# Patient Record
Sex: FEMALE | Race: WHITE | NOT HISPANIC OR LATINO | Employment: UNEMPLOYED
[De-identification: names, ages, dates, MRNs, and addresses within clinical notes are randomized per-mention and may not be internally consistent; named-entity substitution may affect disease eponyms.]

---

## 2017-01-13 ENCOUNTER — CHARTING TRANS (OUTPATIENT)
Dept: OTHER | Age: 47
End: 2017-01-13

## 2017-01-13 ASSESSMENT — PAIN SCALES - GENERAL: PAINLEVEL_OUTOF10: 0

## 2017-02-15 ENCOUNTER — IMAGING SERVICES (OUTPATIENT)
Dept: OTHER | Age: 47
End: 2017-02-15

## 2017-02-15 ENCOUNTER — HOSPITAL (OUTPATIENT)
Dept: OTHER | Age: 47
End: 2017-02-15
Attending: RADIOLOGY

## 2017-03-16 ENCOUNTER — OFFICE VISIT (OUTPATIENT)
Dept: OBGYN CLINIC | Facility: CLINIC | Age: 47
End: 2017-03-16

## 2017-03-16 VITALS
HEIGHT: 63 IN | BODY MASS INDEX: 31.71 KG/M2 | DIASTOLIC BLOOD PRESSURE: 60 MMHG | SYSTOLIC BLOOD PRESSURE: 126 MMHG | WEIGHT: 179 LBS | HEART RATE: 88 BPM

## 2017-03-16 DIAGNOSIS — N76.0 VAGINITIS AND VULVOVAGINITIS: Primary | ICD-10-CM

## 2017-03-16 DIAGNOSIS — Z01.419 WELL WOMAN EXAM WITH ROUTINE GYNECOLOGICAL EXAM: ICD-10-CM

## 2017-03-16 PROCEDURE — 99213 OFFICE O/P EST LOW 20 MIN: CPT | Performed by: OBSTETRICS & GYNECOLOGY

## 2017-03-16 PROCEDURE — 87480 CANDIDA DNA DIR PROBE: CPT | Performed by: OBSTETRICS & GYNECOLOGY

## 2017-03-16 PROCEDURE — 87660 TRICHOMONAS VAGIN DIR PROBE: CPT | Performed by: OBSTETRICS & GYNECOLOGY

## 2017-03-16 PROCEDURE — 87510 GARDNER VAG DNA DIR PROBE: CPT | Performed by: OBSTETRICS & GYNECOLOGY

## 2017-03-16 RX ORDER — VALACYCLOVIR HYDROCHLORIDE 1 G/1
TABLET, FILM COATED ORAL
Refills: 1 | COMMUNITY
Start: 2017-03-03 | End: 2019-06-07

## 2017-03-16 RX ORDER — HYDROXYZINE HYDROCHLORIDE 10 MG/1
10 TABLET, FILM COATED ORAL 3 TIMES DAILY PRN
Refills: 0 | Status: CANCELLED | OUTPATIENT
Start: 2017-03-16

## 2017-03-16 NOTE — PROCEDURES
Dima Kearney is a 48year old female. 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 3 weeks with itching and fissures ext vulva.   No dysuria or fever

## 2017-06-14 ENCOUNTER — HOSPITAL (OUTPATIENT)
Dept: OTHER | Age: 47
End: 2017-06-14
Attending: INTERNAL MEDICINE

## 2017-11-04 ENCOUNTER — HOSPITAL ENCOUNTER (EMERGENCY)
Facility: HOSPITAL | Age: 47
Discharge: HOME | End: 2017-11-05
Attending: EMERGENCY MEDICINE
Payer: COMMERCIAL

## 2017-11-04 ENCOUNTER — APPOINTMENT (OUTPATIENT)
Dept: GENERAL RADIOLOGY | Facility: HOSPITAL | Age: 47
End: 2017-11-04
Attending: EMERGENCY MEDICINE
Payer: COMMERCIAL

## 2017-11-04 VITALS
DIASTOLIC BLOOD PRESSURE: 95 MMHG | TEMPERATURE: 99 F | OXYGEN SATURATION: 98 % | SYSTOLIC BLOOD PRESSURE: 135 MMHG | WEIGHT: 170 LBS | BODY MASS INDEX: 30.12 KG/M2 | RESPIRATION RATE: 20 BRPM | HEART RATE: 100 BPM | HEIGHT: 63 IN

## 2017-11-04 DIAGNOSIS — S46.911A STRAIN OF RIGHT SHOULDER, INITIAL ENCOUNTER: Primary | ICD-10-CM

## 2017-11-04 PROCEDURE — 99283 EMERGENCY DEPT VISIT LOW MDM: CPT

## 2017-11-04 PROCEDURE — 73030 X-RAY EXAM OF SHOULDER: CPT | Performed by: EMERGENCY MEDICINE

## 2017-11-04 RX ORDER — IBUPROFEN 600 MG/1
600 TABLET ORAL ONCE
Status: COMPLETED | OUTPATIENT
Start: 2017-11-04 | End: 2017-11-04

## 2017-11-05 NOTE — ED PROVIDER NOTES
Patient Seen in: [facility] Emergency Department    History   Patient presents with:  Upper Extremity Injury (musculoskeletal)    Stated Complaint: rt arm pain hx of fall    HPI    Sharp constant right shoulder pain after a fall at [employer].   Patient and atraumatic. Eyes: Conjunctivae are normal. Pupils are equal, round, and reactive to light. No scleral icterus. Neck: Normal range of motion. Neck supple. No C-spine tenderness   Cardiovascular: Normal rate and intact distal pulses.     Pulmonary/C ibuprofen. She will be discharged home with PCP follow-up.   Patient is well-appearing nontoxic stable for discharge      Disposition and Plan     Clinical Impression:  Strain of right shoulder, initial encounter  (primary encounter diagnosis)    Dispositi

## 2017-11-05 NOTE — ED INITIAL ASSESSMENT (HPI)
At approx 2100 patient was at a fast food restaurant and slipped on a wet floor and fell and injured her lip, right shoulder, and left knee.  She has limited ROM and radiating pain to the thumb on the right arm

## 2017-11-13 PROBLEM — S40.021A CONTUSION OF MULTIPLE SITES OF RIGHT SHOULDER AND UPPER ARM, INITIAL ENCOUNTER: Status: ACTIVE | Noted: 2017-11-13

## 2017-11-13 PROBLEM — S40.011A CONTUSION OF MULTIPLE SITES OF RIGHT SHOULDER AND UPPER ARM, INITIAL ENCOUNTER: Status: ACTIVE | Noted: 2017-11-13

## 2017-11-19 PROBLEM — M25.519 SHOULDER PAIN, ACUTE: Status: ACTIVE | Noted: 2017-11-19

## 2017-11-19 PROBLEM — R29.898 WEAKNESS OF SHOULDER: Status: ACTIVE | Noted: 2017-11-19

## 2017-11-21 ENCOUNTER — CHARTING TRANS (OUTPATIENT)
Dept: OTHER | Age: 47
End: 2017-11-21

## 2017-11-21 ASSESSMENT — PAIN SCALES - GENERAL: PAINLEVEL_OUTOF10: 0

## 2017-12-21 PROBLEM — N76.0 VAGINITIS AND VULVOVAGINITIS: Status: RESOLVED | Noted: 2017-03-16 | Resolved: 2017-12-21

## 2018-04-30 ENCOUNTER — APPOINTMENT (OUTPATIENT)
Dept: CT IMAGING | Facility: HOSPITAL | Age: 48
End: 2018-04-30
Payer: COMMERCIAL

## 2018-04-30 ENCOUNTER — HOSPITAL ENCOUNTER (EMERGENCY)
Facility: HOSPITAL | Age: 48
Discharge: HOME | End: 2018-05-01
Payer: COMMERCIAL

## 2018-04-30 DIAGNOSIS — J01.91 ACUTE RECURRENT SINUSITIS, UNSPECIFIED LOCATION: Primary | ICD-10-CM

## 2018-04-30 PROCEDURE — 70450 CT HEAD/BRAIN W/O DYE: CPT

## 2018-04-30 PROCEDURE — 99284 EMERGENCY DEPT VISIT MOD MDM: CPT

## 2018-04-30 PROCEDURE — 96361 HYDRATE IV INFUSION ADD-ON: CPT

## 2018-04-30 PROCEDURE — 80053 COMPREHEN METABOLIC PANEL: CPT

## 2018-04-30 PROCEDURE — 85025 COMPLETE CBC W/AUTO DIFF WBC: CPT

## 2018-04-30 PROCEDURE — 96374 THER/PROPH/DIAG INJ IV PUSH: CPT

## 2018-04-30 PROCEDURE — 96375 TX/PRO/DX INJ NEW DRUG ADDON: CPT

## 2018-04-30 RX ORDER — KETOROLAC TROMETHAMINE 30 MG/ML
10 INJECTION, SOLUTION INTRAMUSCULAR; INTRAVENOUS ONCE
Status: COMPLETED | OUTPATIENT
Start: 2018-04-30 | End: 2018-04-30

## 2018-04-30 RX ORDER — SODIUM CHLORIDE 9 MG/ML
1000 INJECTION, SOLUTION INTRAVENOUS ONCE
Status: COMPLETED | OUTPATIENT
Start: 2018-04-30 | End: 2018-05-01

## 2018-04-30 RX ORDER — DIPHENHYDRAMINE HYDROCHLORIDE 50 MG/ML
25 INJECTION INTRAMUSCULAR; INTRAVENOUS ONCE
Status: COMPLETED | OUTPATIENT
Start: 2018-04-30 | End: 2018-04-30

## 2018-04-30 RX ORDER — METOCLOPRAMIDE HYDROCHLORIDE 5 MG/ML
5 INJECTION INTRAMUSCULAR; INTRAVENOUS ONCE
Status: COMPLETED | OUTPATIENT
Start: 2018-04-30 | End: 2018-04-30

## 2018-05-01 VITALS
TEMPERATURE: 99 F | BODY MASS INDEX: 31.01 KG/M2 | DIASTOLIC BLOOD PRESSURE: 76 MMHG | SYSTOLIC BLOOD PRESSURE: 121 MMHG | HEART RATE: 86 BPM | RESPIRATION RATE: 17 BRPM | HEIGHT: 63 IN | OXYGEN SATURATION: 98 % | WEIGHT: 175 LBS

## 2018-05-01 RX ORDER — FLUCONAZOLE 200 MG/1
200 TABLET ORAL DAILY
Qty: 1 TABLET | Refills: 0 | Status: SHIPPED | OUTPATIENT
Start: 2018-05-01 | End: 2018-07-02

## 2018-05-01 RX ORDER — DOXYCYCLINE 100 MG/1
100 CAPSULE ORAL 2 TIMES DAILY
Qty: 20 CAPSULE | Refills: 0 | Status: SHIPPED | OUTPATIENT
Start: 2018-05-01 | End: 2018-05-11

## 2018-05-01 RX ORDER — DOXYCYCLINE HYCLATE 100 MG/1
100 CAPSULE ORAL ONCE
Status: COMPLETED | OUTPATIENT
Start: 2018-05-01 | End: 2018-05-01

## 2018-05-01 NOTE — ED INITIAL ASSESSMENT (HPI)
Headache x 24 hours. + nausea no emesis, taking advil cold/sinus, mucinex with no relief.  Has had sinus infection x 2 months z pack completed early April

## 2018-05-01 NOTE — ED PROVIDER NOTES
Patient Seen in: [facility] Emergency Department    History   Patient presents with:  Headache (neurologic)    Stated Complaint: head pressure x couple months worse over past 24 hours    HPI    49-year-old female who presents this emergency departme Packs/day: 0.00      Years: 0.00         Types: Cigarettes  Smokeless tobacco: Never Used                      Alcohol use:  No                Review of Systems    Positive for stated complaint: head pressure x couple month deep tendon reflexes at the knees and ankles bilaterally        ED Course     Labs Reviewed   COMP METABOLIC PANEL (14) - Abnormal; Notable for the following:        Result Value    Glucose 271 (*)     Alkaline Phosphatase 182 (*)     Alt 56 (*)     All ot midline shift. There are no intraparenchymal brain abnormalities. There is nothing specific for acute infarct. There is no hemorrhage or mass lesion. SINUSES:           Complete opacification of the left maxillary sinus.   There is an air-fluid level wi diagnosis)    Disposition:  There is no disposition on file for this visit. There is no disposition time on file for this visit.     Follow-up:  Lana Iglesias, [address] [phone]    In 1 wee

## 2018-06-18 ENCOUNTER — HOSPITAL ENCOUNTER (OUTPATIENT)
Age: 48
Discharge: HOME | End: 2018-06-18
Attending: FAMILY MEDICINE
Payer: COMMERCIAL

## 2018-06-18 VITALS
HEIGHT: 62 IN | SYSTOLIC BLOOD PRESSURE: 123 MMHG | OXYGEN SATURATION: 97 % | TEMPERATURE: 98 F | DIASTOLIC BLOOD PRESSURE: 90 MMHG | BODY MASS INDEX: 29.44 KG/M2 | WEIGHT: 160 LBS | RESPIRATION RATE: 20 BRPM | HEART RATE: 114 BPM

## 2018-06-18 DIAGNOSIS — E11.65 TYPE 2 DIABETES MELLITUS WITH HYPERGLYCEMIA, WITHOUT LONG-TERM CURRENT USE OF INSULIN (HCC): Primary | ICD-10-CM

## 2018-06-18 PROCEDURE — 94640 AIRWAY INHALATION TREATMENT: CPT

## 2018-06-18 PROCEDURE — 93005 ELECTROCARDIOGRAM TRACING: CPT

## 2018-06-18 PROCEDURE — 81002 URINALYSIS NONAUTO W/O SCOPE: CPT | Performed by: FAMILY MEDICINE

## 2018-06-18 PROCEDURE — 84484 ASSAY OF TROPONIN QUANT: CPT

## 2018-06-18 PROCEDURE — 93010 ELECTROCARDIOGRAM REPORT: CPT

## 2018-06-18 PROCEDURE — 85025 COMPLETE CBC W/AUTO DIFF WBC: CPT | Performed by: FAMILY MEDICINE

## 2018-06-18 PROCEDURE — 99215 OFFICE O/P EST HI 40 MIN: CPT

## 2018-06-18 PROCEDURE — 82962 GLUCOSE BLOOD TEST: CPT

## 2018-06-18 PROCEDURE — 96360 HYDRATION IV INFUSION INIT: CPT

## 2018-06-18 PROCEDURE — 99204 OFFICE O/P NEW MOD 45 MIN: CPT

## 2018-06-18 PROCEDURE — 80047 BASIC METABLC PNL IONIZED CA: CPT

## 2018-06-18 RX ORDER — IPRATROPIUM BROMIDE AND ALBUTEROL SULFATE 2.5; .5 MG/3ML; MG/3ML
3 SOLUTION RESPIRATORY (INHALATION) ONCE
Status: COMPLETED | OUTPATIENT
Start: 2018-06-18 | End: 2018-06-18

## 2018-06-18 RX ORDER — SODIUM CHLORIDE 9 MG/ML
1000 INJECTION, SOLUTION INTRAVENOUS ONCE
Status: COMPLETED | OUTPATIENT
Start: 2018-06-18 | End: 2018-06-18

## 2018-06-18 RX ORDER — IBUPROFEN 200 MG
200 TABLET ORAL EVERY 6 HOURS PRN
COMMUNITY

## 2018-06-18 NOTE — ED INITIAL ASSESSMENT (HPI)
Pt. States she has had a sinus issue going on for several months. Has been on several antibiotics & several courses of steroids. States they checked her blood sugar yesterday at family party=501.   States she has had some blurry vision in the past couple we

## 2018-06-23 ENCOUNTER — APPOINTMENT (OUTPATIENT)
Dept: ULTRASOUND IMAGING | Facility: HOSPITAL | Age: 48
End: 2018-06-23
Attending: EMERGENCY MEDICINE
Payer: COMMERCIAL

## 2018-06-23 ENCOUNTER — APPOINTMENT (OUTPATIENT)
Dept: GENERAL RADIOLOGY | Facility: HOSPITAL | Age: 48
End: 2018-06-23
Attending: EMERGENCY MEDICINE
Payer: COMMERCIAL

## 2018-06-23 ENCOUNTER — HOSPITAL ENCOUNTER (EMERGENCY)
Facility: HOSPITAL | Age: 48
Discharge: HOME | End: 2018-06-23
Attending: EMERGENCY MEDICINE
Payer: COMMERCIAL

## 2018-06-23 VITALS
HEART RATE: 101 BPM | DIASTOLIC BLOOD PRESSURE: 87 MMHG | BODY MASS INDEX: 28.35 KG/M2 | RESPIRATION RATE: 16 BRPM | SYSTOLIC BLOOD PRESSURE: 123 MMHG | OXYGEN SATURATION: 97 % | WEIGHT: 160 LBS | TEMPERATURE: 98 F | HEIGHT: 63 IN

## 2018-06-23 DIAGNOSIS — E11.65 TYPE 2 DIABETES MELLITUS WITH HYPERGLYCEMIA, WITHOUT LONG-TERM CURRENT USE OF INSULIN (HCC): Primary | ICD-10-CM

## 2018-06-23 PROCEDURE — 93970 EXTREMITY STUDY: CPT | Performed by: EMERGENCY MEDICINE

## 2018-06-23 PROCEDURE — 83036 HEMOGLOBIN GLYCOSYLATED A1C: CPT | Performed by: FAMILY MEDICINE

## 2018-06-23 PROCEDURE — 93010 ELECTROCARDIOGRAM REPORT: CPT

## 2018-06-23 PROCEDURE — 87086 URINE CULTURE/COLONY COUNT: CPT | Performed by: EMERGENCY MEDICINE

## 2018-06-23 PROCEDURE — 99285 EMERGENCY DEPT VISIT HI MDM: CPT

## 2018-06-23 PROCEDURE — 96361 HYDRATE IV INFUSION ADD-ON: CPT

## 2018-06-23 PROCEDURE — 71045 X-RAY EXAM CHEST 1 VIEW: CPT | Performed by: EMERGENCY MEDICINE

## 2018-06-23 PROCEDURE — 96360 HYDRATION IV INFUSION INIT: CPT

## 2018-06-23 PROCEDURE — 84484 ASSAY OF TROPONIN QUANT: CPT | Performed by: EMERGENCY MEDICINE

## 2018-06-23 PROCEDURE — 93005 ELECTROCARDIOGRAM TRACING: CPT

## 2018-06-23 PROCEDURE — 81001 URINALYSIS AUTO W/SCOPE: CPT | Performed by: EMERGENCY MEDICINE

## 2018-06-23 PROCEDURE — 85025 COMPLETE CBC W/AUTO DIFF WBC: CPT | Performed by: EMERGENCY MEDICINE

## 2018-06-23 PROCEDURE — 80053 COMPREHEN METABOLIC PANEL: CPT | Performed by: EMERGENCY MEDICINE

## 2018-06-23 PROCEDURE — 82962 GLUCOSE BLOOD TEST: CPT

## 2018-06-23 PROCEDURE — 85378 FIBRIN DEGRADE SEMIQUANT: CPT | Performed by: EMERGENCY MEDICINE

## 2018-06-23 RX ORDER — INSULIN ASPART 100 [IU]/ML
0.1 INJECTION, SOLUTION INTRAVENOUS; SUBCUTANEOUS ONCE
Status: DISCONTINUED | OUTPATIENT
Start: 2018-06-23 | End: 2018-06-23

## 2018-06-23 NOTE — ED PROVIDER NOTES
[address] 
[phone] Patient: Brittanie Braxton MRN: [id number] Visit Information Date & Time Provider Department Dept. Phone Encounter #  
 2018 11:00 AM Delia Carbajal, 403 [facility] [phone] [id number] Follow-up Instructions Return if symptoms worsen or fail to improve. Upcoming Health Maintenance Date Due  
 PAP AKA CERVICAL CYTOLOGY 3/6/2020 DTaP/Tdap/Td series (2 - Td) 2028 Allergies as of 2018  Review Complete On: 2018 By: Delia Carbajal MD  
 No Known Allergies Current Immunizations  Never Reviewed No immunizations on file. Not reviewed this visit You Were Diagnosed With   
  
 Codes Comments Irregular periods/menstrual cycles    -  Primary ICD-10-CM: N92.6 ICD-9-CM: 626.4 Gastroesophageal reflux disease with esophagitis     ICD-10-CM: K21.0 ICD-9-CM: 530.11 Secondary amenorrhea     ICD-10-CM: N91.1 ICD-9-CM: 626.0 Vitals BP Pulse Temp Resp Height(growth percentile) Weight(growth percentile) 103/77 (BP 1 Location: Left arm, BP Patient Position: Sitting) 86 98 °F (36.7 °C) (Oral) 20 5' 3\" (1.6 m) 177 lb 12.8 oz (80.6 kg) SpO2 BMI OB Status Smoking Status 98% 31.5 kg/m2 Breastfeeding Never Smoker Vitals History BMI and BSA Data Body Mass Index Body Surface Area 31.5 kg/m 2 1.89 m 2 Preferred Pharmacy Pharmacy Name Phone [id number] Annabelle Tony, [address] [facility] [phone] Your Updated Medication List  
  
   
This list is accurate as of: 18 11:40 AM.  Always use your most recent med list.  
  
  
  
  
 pantoprazole 40 mg tablet Commonly known as:  PROTONIX Take 1 Tab by mouth daily. Prescriptions Sent to Pharmacy Refills Patient Seen in: [facility] Emergency Department    History   Patient presents with:  Swelling Edema (cardiovascular, metabolic)    Stated Complaint: generalized body swelling.  Recently started new medicine- Metfformin    HPI    Patient is a 50-year Extraocular muscles intact, pupils equal round reactive to light and accommodation. Mouth normal, neck supple, no meningismus. LUNGS: Lungs clear to auscultation bilaterally. CARDIOVASCULAR: + S1-S2, regular rate and rhythm, no murmurs.   BACK: No CVA te pantoprazole (PROTONIX) 40 mg tablet 0 Sig: Take 1 Tab by mouth daily. Class: Normal  
 [facility] [address], [address] [facility] #: [phone] Route: Oral  
  
We Performed the Following BETA HCG, QT [id number] CPT(R)] CBC WITH AUTOMATED DIFF [85318 CPT(R)] ESTRADIOL [id number] CPT(R)] 271 [facility] AND LH [79796 CPT(R)] [address], IGA [id number] CPT(R)] HEMOGLOBIN A1C WITH EAG [83616 CPT(R)] REFERRAL TO GASTROENTEROLOGY [YLB93 Custom] Comments:  
 Please evaluate patient for EGD. TESTOSTERONE, TOTAL, FEMALE/CHILD [id number] CPT(R)] TSH AND FREE T4 [28567 CPT(R)] Follow-up Instructions Return if symptoms worsen or fail to improve. Referral Information Referral ID Referred By Referred To  
  
 [id number] [facility] 217 [facility] [phone] [facility], [facility] Visits Status Start Date End Date 1 New Request 2/1/18 2/1/19 If your referral has a status of pending review or denied, additional information will be sent to support the outcome of this decision. Patient Instructions Secondary Amenorrhea: Care Instructions Your Care Instructions Amenorrhea means you do not have menstrual periods. There are two types. Primary amenorrhea means you never start your periods. Secondary amenorrhea means you have had periods, and then they stop, especially for more than 3 months. Even if you don't have periods, you could still get pregnant. You may not know what caused your periods to stop. Possible causes include pregnancy, hormonal changes, and losing or gaining a lot of weight quickly. Some medicines and stress could also cause it. Being active in endurance sports can also cause you to miss your period or stop menstruating.  Female athletes may try to lose or maintain weight in Trimester:  0.13-1.70 ug/mL (FEU)    In pregnant females, refer to the chart above.   No studies have been performed  on pregnant females exclusively to validate the 95% negative predictive value  for venous thromboembolism when the D-Dimer is greater than harmful ways. These include dieting too much or binging and purging. But doing these things can lead to eating disorders, amenorrhea, and osteoporosis. If you exercise less or gain a little weight, your periods will probably start again. Your doctor may order tests to find out why your periods have stopped. Your doctor may give you the hormone progestin. It can cause you to have a period. Talk to your doctor if you do not have a period for 3 months or more. Going for a long amount of time without a period can raise your chance of getting cancer of the lining of the uterus later in life. Follow-up care is a key part of your treatment and safety. Be sure to make and go to all appointments, and call your doctor if you are having problems. It's also a good idea to know your test results and keep a list of the medicines you take. How can you care for yourself at home? · Eat a healthy, balanced diet. This includes fruits, vegetables, whole grains, proteins, and low-fat dairy products. · Do light exercise, unless your doctor told you not to exercise. · Use birth control if you do not want to get pregnant. When should you call for help? Call your doctor now or seek immediate medical care if: 
? · You have severe vaginal bleeding. ? · You have new or worse belly or pelvic pain. ? Watch closely for changes in your health, and be sure to contact your doctor if: 
? · You have unusual vaginal bleeding. ? · You think you might be pregnant. ? · You do not get better as expected. Where can you learn more? Go to http://ernie-martha.info/. Enter [id number] in the search box to learn more about \"Secondary Amenorrhea: Care Instructions. \" Current as of: October 13, 2016 Content Version: 11.4 © 1363-6984 ttwick.  Care instructions adapted under license by [employer]. (which disclaims liability or warranty days          Medications Prescribed:  Discharge Medication List as of 6/23/2018  3:37 PM    START taking these medications    SITagliptin Phosphate (JANUVIA) 50 MG Oral Tab  Take 1 tablet (50 mg total) by mouth daily. , Normal, Disp-14 tablet, R-0 for this information). If you have questions about a medical condition or this instruction, always ask your healthcare professional. [address] any warranty or liability for your use of this information. Introducing [facility] SERVICES! Misti Morris introduces [employer] patient portal. Now you can access parts of your medical record, email your doctor's office, and request medication refills online. 1. In your internet browser, go to [email or web address] 2. Click on the First Time User? Click Here link in the Sign In box. You will see the New Member Sign Up page. 3. Enter your Inkling Access Code exactly as it appears below. You will not need to use this code after youve completed the sign-up process. If you do not sign up before the expiration date, you must request a new code. · Inkling Access Code: [email or web address] Expires: 5/2/2018 10:52 AM 
 
4. Enter the last four digits of your Social Security Number (xxxx) and Date of Birth (mm/dd/yyyy) as indicated and click Submit. You will be taken to the next sign-up page. 5. Create a Inkling ID. This will be your Inkling login ID and cannot be changed, so think of one that is secure and easy to remember. 6. Create a Inkling password. You can change your password at any time. 7. Enter your Password Reset Question and Answer. This can be used at a later time if you forget your password. 8. Enter your e-mail address. You will receive e-mail notification when new information is available in [address] 9. Click Sign Up. You can now view and download portions of your medical record. 10. Click the Download Summary menu link to download a portable copy of your medical information. If you have questions, please visit the Frequently Asked Questions section of the In[employer]ling website. Remember, [employer]ling is NOT to be used for urgent needs. For medical emergencies, dial 911. Now available from your iPhone and Android! Please provide this summary of care documentation to your next provider. Your primary care clinician is listed as Derrick Mcclendon. If you have any questions after today's visit, please call [phone].

## 2018-06-23 NOTE — ED INITIAL ASSESSMENT (HPI)
Pt presents to ER with new dx of diabetes. Pt just started metformin on Monday. Pt has noted swollen, throbbing in legs, and tightness in hands. Pt is speaking clearly. Skin warm and dry. Pt is a&ox3. [place] steady. Yes diarrhea, no vomiting. No abd pains.  Cristine Ruiz

## 2018-06-23 NOTE — ED NOTES
Pt called with symptoms of swelling, edema, chills and no fever, and is not feeling well. She was diagnosed with new onset diabetes, and is now on Metformin. Her blood sugars have been 150's to 200's. I spoke with Dr. María Fulton , who saw this pt.   He would

## 2018-06-25 ENCOUNTER — OFFICE VISIT (OUTPATIENT)
Dept: FAMILY MEDICINE CLINIC | Facility: CLINIC | Age: 48
End: 2018-06-25

## 2018-06-25 VITALS
RESPIRATION RATE: 14 BRPM | HEIGHT: 63 IN | TEMPERATURE: 98 F | WEIGHT: 164 LBS | DIASTOLIC BLOOD PRESSURE: 80 MMHG | SYSTOLIC BLOOD PRESSURE: 120 MMHG | HEART RATE: 120 BPM | BODY MASS INDEX: 29.06 KG/M2

## 2018-06-25 DIAGNOSIS — E11.65 TYPE 2 DIABETES MELLITUS WITH HYPERGLYCEMIA, WITHOUT LONG-TERM CURRENT USE OF INSULIN (HCC): Primary | ICD-10-CM

## 2018-06-25 PROCEDURE — 99203 OFFICE O/P NEW LOW 30 MIN: CPT | Performed by: FAMILY MEDICINE

## 2018-06-25 RX ORDER — LISINOPRIL 2.5 MG/1
2.5 TABLET ORAL DAILY
Qty: 90 TABLET | Refills: 1 | Status: SHIPPED | OUTPATIENT
Start: 2018-06-25 | End: 2018-12-05

## 2018-06-25 RX ORDER — HYDROXYZINE HYDROCHLORIDE 10 MG/1
10 TABLET, FILM COATED ORAL 2 TIMES DAILY PRN
COMMUNITY
End: 2018-12-06

## 2018-06-25 NOTE — PROGRESS NOTES
[address] [facility] Family Medicine Office Note  Chief Complaint:   Patient presents with:  Diabetes: NP, new DX diabetes, seen in ER 6/23/18      HPI:   This is a 50year old female coming in for new onset of diabetes.   Patient was seen in urgent care an throat  Bactrim [Sulfametho*    HIVES  Ceclor [Cefaclor]       ITCHING  Clindamycin             FACE FLUSHING    Comment:redness on eyelids  Eggs Or Egg-Derived*    ITCHING  Levaquin [Levofloxa*    FACE FLUSHING    Comment:Eyelid redness  Penicillins stiffness. ENDOCRINOLOGIC:  Denies reports of sweating, cold or heat intolerance. Denies polyuria or polydipsia.     EXAM:   /80 (BP Location: Left arm, Patient Position: Sitting, Cuff Size: adult)   Pulse 120   Temp 97.8 °F (36.6 °C) (Oral)   Resp 1 tablet; Refill: 0  - OP REFERRAL NEW ONSET DIABETES- NO PREV EDUCATION 10 HRS  - lisinopril 2.5 MG Oral Tab; Take 1 tablet (2.5 mg total) by mouth daily. Dispense: 90 tablet;  Refill: 1      Meds & Refills for this Visit:  Signed Prescriptions Disp Refills

## 2018-06-25 NOTE — PATIENT INSTRUCTIONS
Understanding Carbohydrates, Fats, and Protein  Food is a source of fuel and nourishment for your body. It’s also a source of pleasure. Having diabetes doesn’t mean you have to eat special foods or give up desserts.  Instead, your dietitian can show you h · Polyunsaturated fats are mostly found in vegetable oils, such as corn, safflower, and soybean oils. They are also found in some seeds, nuts, and fish. Polyunsaturated fats lower LDL (unhealthy) cholesterol.  So, choosing them instead of saturated fats is Food is an important tool that you can use to control diabetes and stay healthy. Eating well-balanced meals in the correct amounts will help you control your blood glucose levels and prevent low blood sugar reactions.  It will also help you reduce the healt · Avoid added salt. It can contribute to high blood pressure, which can cause heart disease. People with diabetes already have a risk of high blood pressure and heart disease. · Stay at a healthy weight.  If you need to lose weight, cut down on your portio · Kidney disease (nephropathy) can eventually lead to kidney failure, which may require dialysis or kidney transplant   · Nerve problems (neuropathy), causing pain or loss of feeling in your feet and other parts of your body, potentially leading to an ampu · Dry beans, and peas   Keep track of the amount of carbohydrates you eat. This can help you keep the right balance of physical activity and medicine. The amount of carbohydrates needed will vary for each person.  It depends on many things such as your heal The amount of food you eat affects your blood sugar. It also affects your weight. Your healthcare team will tell you how much of each type of food you should eat. · Use measuring cups and spoons and a food scale to measure serving sizes.   · Learn what a c Portion sizes are important to controlling your blood sugar and staying at a healthy weight. Stock up on healthy snack items so you always have them on hand. When to eat  Your meal plan will likely include breakfast, lunch, dinner, and some snacks.   · Try Have your vitamin B12 levels checked regularly if you have used Biguanides for a long time. This is especially important if you have anemia or peripheral neuropathy. Sulfonylureas  These pills help your body make more insulin.  They are usually taken 30 mi · Development of keotacidosis while blood sugar is only mildly raised above the target range  Note: The FDA has issued a safety warning for the SGLT-2 inhibitor canagliflozin.  Recent studies have shown that this medicine increases the risk of leg and foot The digestive system breaks down food, resulting in a sugar called glucose. Some of this glucose is stored in the liver. But most of it enters the bloodstream and travels to the cells to be used as fuel.  Glucose needs the help of a hormone called insulin t © 7491-0308 [address]. [facility] [facility], [facility] [place]. All rights reserved. This information is not intended as a substitute for professional medical care. Always follow your healthcare professional's instructions.         Diabete · Sensations such as burning, tingling, or “pins and needles” can be signs of a problem. Also check for areas that may be numb. · Hot spots are caused by friction or pressure. Look for hot spots in areas that get a lot of rubbing.  Hot spots can turn into

## 2018-06-28 ENCOUNTER — APPOINTMENT (OUTPATIENT)
Dept: LAB | Age: 48
End: 2018-06-28
Attending: FAMILY MEDICINE
Payer: COMMERCIAL

## 2018-06-28 DIAGNOSIS — E11.65 TYPE 2 DIABETES MELLITUS WITH HYPERGLYCEMIA, WITHOUT LONG-TERM CURRENT USE OF INSULIN (HCC): ICD-10-CM

## 2018-06-28 PROCEDURE — 80061 LIPID PANEL: CPT | Performed by: FAMILY MEDICINE

## 2018-06-28 PROCEDURE — 36415 COLL VENOUS BLD VENIPUNCTURE: CPT | Performed by: FAMILY MEDICINE

## 2018-07-02 ENCOUNTER — APPOINTMENT (OUTPATIENT)
Dept: LAB | Age: 48
End: 2018-07-02
Attending: FAMILY MEDICINE
Payer: COMMERCIAL

## 2018-07-02 ENCOUNTER — OFFICE VISIT (OUTPATIENT)
Dept: FAMILY MEDICINE CLINIC | Facility: CLINIC | Age: 48
End: 2018-07-02

## 2018-07-02 VITALS
DIASTOLIC BLOOD PRESSURE: 70 MMHG | BODY MASS INDEX: 28.88 KG/M2 | HEIGHT: 63 IN | RESPIRATION RATE: 16 BRPM | SYSTOLIC BLOOD PRESSURE: 114 MMHG | HEART RATE: 108 BPM | OXYGEN SATURATION: 98 % | WEIGHT: 163 LBS

## 2018-07-02 DIAGNOSIS — E11.65 TYPE 2 DIABETES MELLITUS WITH HYPERGLYCEMIA, WITHOUT LONG-TERM CURRENT USE OF INSULIN (HCC): Primary | ICD-10-CM

## 2018-07-02 DIAGNOSIS — E11.65 TYPE 2 DIABETES MELLITUS WITH HYPERGLYCEMIA, WITHOUT LONG-TERM CURRENT USE OF INSULIN (HCC): ICD-10-CM

## 2018-07-02 LAB
CREAT UR-SCNC: <13 MG/DL
MICROALBUMIN UR-MCNC: 0.76 MG/DL

## 2018-07-02 PROCEDURE — 99214 OFFICE O/P EST MOD 30 MIN: CPT | Performed by: FAMILY MEDICINE

## 2018-07-02 PROCEDURE — 82570 ASSAY OF URINE CREATININE: CPT | Performed by: FAMILY MEDICINE

## 2018-07-02 PROCEDURE — 82043 UR ALBUMIN QUANTITATIVE: CPT | Performed by: FAMILY MEDICINE

## 2018-07-02 NOTE — PROGRESS NOTES
454 [facility] Family Medicine Office Note  Chief Complaint:   Patient presents with: Follow - Up: from recent labs. HPI:   This is a 50year old female coming in for new onset of diabetes.   Patient is here today for follow-up as her A1c was 1 Comment:sensitivity  Shellfish-Derived P*        Comment:Positive on RAST test  Current Meds:    Current Outpatient Prescriptions:  Dapagliflozin Propanediol (FARXIGA) 5 MG Oral Tab Take 5 mg by mouth daily.  Disp: 28 tablet Rfl: 0   HydrOXYzine HCl 10 MG O as calculated from the following:    Height as of this encounter: 63\". Weight as of this encounter: 163 lb. Vital signs reviewed. Appears stated age, well groomed.   Physical Exam:  GEN:  Patient is alert and oriented x3, no apparent distress  HEAD:  N PPSV23 Medium Risk Adult(1 of 1 - PPSV23) due on 08/01/1987  Pap Smear,3 Years due on 08/01/1999  Mammogram due on 09/26/2017  Annual Depression Screen due on 03/16/2018    Patient/Caregiver Education: Patient/Caregiver Education: There are no barriers to

## 2018-07-03 ENCOUNTER — NURSE ONLY (OUTPATIENT)
Dept: ENDOCRINOLOGY CLINIC | Facility: CLINIC | Age: 48
End: 2018-07-03

## 2018-07-03 VITALS
BODY MASS INDEX: 28.88 KG/M2 | DIASTOLIC BLOOD PRESSURE: 84 MMHG | SYSTOLIC BLOOD PRESSURE: 114 MMHG | HEART RATE: 103 BPM | HEIGHT: 63 IN | WEIGHT: 163 LBS

## 2018-07-03 DIAGNOSIS — E11.65 TYPE 2 DIABETES MELLITUS WITH HYPERGLYCEMIA, WITHOUT LONG-TERM CURRENT USE OF INSULIN (HCC): Primary | ICD-10-CM

## 2018-07-03 DIAGNOSIS — E11.65 UNCONTROLLED TYPE 2 DIABETES MELLITUS WITH COMPLICATION, WITHOUT LONG-TERM CURRENT USE OF INSULIN (HCC): Primary | ICD-10-CM

## 2018-07-03 DIAGNOSIS — E11.8 UNCONTROLLED TYPE 2 DIABETES MELLITUS WITH COMPLICATION, WITHOUT LONG-TERM CURRENT USE OF INSULIN (HCC): Primary | ICD-10-CM

## 2018-07-03 PROCEDURE — G0108 DIAB MANAGE TRN  PER INDIV: HCPCS

## 2018-07-03 NOTE — PROGRESS NOTES
Izaiah Haley  : 1970 attended Step 1 Diabetic Education:    Date: 7/3/2018   Start time: 2:30p End time: 3:30    /84   Pulse 103   Ht 63\"   Wt 163 lb   BMI 28.87 kg/m²       HgbA1C (%)   Date Value   2018 14.9 (H)   ----------

## 2018-07-13 ENCOUNTER — NURSE ONLY (OUTPATIENT)
Dept: ENDOCRINOLOGY CLINIC | Facility: CLINIC | Age: 48
End: 2018-07-13

## 2018-07-13 DIAGNOSIS — E11.65 TYPE 2 DIABETES MELLITUS WITH HYPERGLYCEMIA, WITHOUT LONG-TERM CURRENT USE OF INSULIN (HCC): Primary | ICD-10-CM

## 2018-07-13 PROCEDURE — G0109 DIAB MANAGE TRN IND/GROUP: HCPCS

## 2018-07-13 NOTE — PROGRESS NOTES
Prashant Domingo  DJF[id number] attended Step 2 Diabetic Education:    Date: 7/13/2018  Start time: 12:30 End time: 2pm      Diabetes Overview, pathophysiology, pre-diabetes, A1C results and treatment options for diabetes self-management, types of diabet

## 2018-07-19 DIAGNOSIS — E11.65 TYPE 2 DIABETES MELLITUS WITH HYPERGLYCEMIA, WITHOUT LONG-TERM CURRENT USE OF INSULIN (HCC): ICD-10-CM

## 2018-08-23 ENCOUNTER — NURSE ONLY (OUTPATIENT)
Dept: ENDOCRINOLOGY CLINIC | Facility: CLINIC | Age: 48
End: 2018-08-23
Payer: COMMERCIAL

## 2018-08-23 DIAGNOSIS — E11.65 TYPE 2 DIABETES MELLITUS WITH HYPERGLYCEMIA, WITHOUT LONG-TERM CURRENT USE OF INSULIN (HCC): Primary | ICD-10-CM

## 2018-08-23 PROCEDURE — G0109 DIAB MANAGE TRN IND/GROUP: HCPCS

## 2018-08-23 NOTE — PROGRESS NOTES
Sree Cronin  [id number] attended Step 3 Diabetic Education:    Date: 8/23/2018  Start time: 2pm  End time: 4pm          Prevention, detection and treatment of chronic complications  Reviewed how to reduce risks of complications including eye, roxana

## 2018-09-18 DIAGNOSIS — E11.65 TYPE 2 DIABETES MELLITUS WITH HYPERGLYCEMIA, WITHOUT LONG-TERM CURRENT USE OF INSULIN (HCC): ICD-10-CM

## 2018-09-19 RX ORDER — SITAGLIPTIN 100 MG/1
TABLET, FILM COATED ORAL
Qty: 90 TABLET | Refills: 0 | Status: SHIPPED | OUTPATIENT
Start: 2018-09-19 | End: 2019-04-23 | Stop reason: ALTCHOICE

## 2018-09-20 ENCOUNTER — HOSPITAL ENCOUNTER (OUTPATIENT)
Dept: CT IMAGING | Age: 48
Discharge: HOME | End: 2018-09-20
Attending: INTERNAL MEDICINE
Payer: COMMERCIAL

## 2018-09-20 DIAGNOSIS — R91.1 PULMONARY NODULE: ICD-10-CM

## 2018-09-20 PROCEDURE — 71250 CT THORAX DX C-: CPT | Performed by: INTERNAL MEDICINE

## 2018-09-24 ENCOUNTER — APPOINTMENT (OUTPATIENT)
Dept: LAB | Age: 48
End: 2018-09-24
Attending: FAMILY MEDICINE
Payer: COMMERCIAL

## 2018-09-24 ENCOUNTER — OFFICE VISIT (OUTPATIENT)
Dept: FAMILY MEDICINE CLINIC | Facility: CLINIC | Age: 48
End: 2018-09-24
Payer: COMMERCIAL

## 2018-09-24 VITALS
DIASTOLIC BLOOD PRESSURE: 70 MMHG | WEIGHT: 146 LBS | HEIGHT: 63 IN | RESPIRATION RATE: 12 BRPM | BODY MASS INDEX: 25.87 KG/M2 | TEMPERATURE: 97 F | HEART RATE: 120 BPM | SYSTOLIC BLOOD PRESSURE: 110 MMHG

## 2018-09-24 DIAGNOSIS — E11.65 UNCONTROLLED TYPE 2 DIABETES MELLITUS WITH HYPERGLYCEMIA, WITHOUT LONG-TERM CURRENT USE OF INSULIN (HCC): ICD-10-CM

## 2018-09-24 DIAGNOSIS — E11.65 UNCONTROLLED TYPE 2 DIABETES MELLITUS WITH HYPERGLYCEMIA, WITHOUT LONG-TERM CURRENT USE OF INSULIN (HCC): Primary | ICD-10-CM

## 2018-09-24 LAB
ALBUMIN SERPL-MCNC: 4.2 G/DL (ref 3.5–4.8)
ALBUMIN/GLOB SERPL: 1.1 {RATIO} (ref 1–2)
ALP LIVER SERPL-CCNC: 89 U/L (ref 39–100)
ALT SERPL-CCNC: 31 U/L (ref 14–54)
ANION GAP SERPL CALC-SCNC: 6 MMOL/L (ref 0–18)
AST SERPL-CCNC: 13 U/L (ref 15–41)
BILIRUB SERPL-MCNC: 0.6 MG/DL (ref 0.1–2)
BUN BLD-MCNC: 16 MG/DL (ref 8–20)
BUN/CREAT SERPL: 19.5 (ref 10–20)
CALCIUM BLD-MCNC: 9.9 MG/DL (ref 8.3–10.3)
CHLORIDE SERPL-SCNC: 105 MMOL/L (ref 101–111)
CO2 SERPL-SCNC: 29 MMOL/L (ref 22–32)
CREAT BLD-MCNC: 0.82 MG/DL (ref 0.55–1.02)
EST. AVERAGE GLUCOSE BLD GHB EST-MCNC: 157 MG/DL (ref 68–126)
GLOBULIN PLAS-MCNC: 4 G/DL (ref 2.5–4)
GLUCOSE BLD-MCNC: 142 MG/DL (ref 70–99)
HBA1C MFR BLD HPLC: 7.1 % (ref ?–5.7)
M PROTEIN MFR SERPL ELPH: 8.2 G/DL (ref 6.1–8.3)
OSMOLALITY SERPL CALC.SUM OF ELEC: 294 MOSM/KG (ref 275–295)
POTASSIUM SERPL-SCNC: 4.2 MMOL/L (ref 3.6–5.1)
SODIUM SERPL-SCNC: 140 MMOL/L (ref 136–144)

## 2018-09-24 PROCEDURE — 99214 OFFICE O/P EST MOD 30 MIN: CPT | Performed by: FAMILY MEDICINE

## 2018-09-24 PROCEDURE — 36415 COLL VENOUS BLD VENIPUNCTURE: CPT | Performed by: FAMILY MEDICINE

## 2018-09-24 PROCEDURE — 80053 COMPREHEN METABOLIC PANEL: CPT | Performed by: FAMILY MEDICINE

## 2018-09-24 PROCEDURE — 83036 HEMOGLOBIN GLYCOSYLATED A1C: CPT | Performed by: FAMILY MEDICINE

## 2018-09-24 NOTE — PROGRESS NOTES
743 [facility] Family Medicine Office Note  Chief Complaint:   Patient presents with:  Diabetes: 3 month DM check      HPI:   This is a 46year old female coming in for follow up of diabetes.   Patient is here today for follow-up as her last A1c was 1 TABLET(100 MG) BY MOUTH DAILY Disp: 90 tablet Rfl: 0   Dapagliflozin Propanediol (FARXIGA) 5 MG Oral Tab Take 5 mg by mouth daily. Disp:  Rfl:    PROAIR  (90 Base) MCG/ACT Inhalation Aero Soln Inhale 2 puffs into the lungs as needed.  Disp:  Rfl: 1 Ht 63\"   Wt 146 lb   BMI 25.86 kg/m²  Estimated body mass index is 25.86 kg/m² as calculated from the following:    Height as of this encounter: 63\". Weight as of this encounter: 146 lb. Vital signs reviewed. Appears stated age, well groomed.   Phys Education: There are no barriers to learning. Medical education done. Outcome: Patient verbalizes understanding. Patient is notified to call with any questions, complications, allergies, or worsening or changing symptoms.   Patient is to call with any real

## 2018-09-26 ENCOUNTER — NURSE ONLY (OUTPATIENT)
Dept: ENDOCRINOLOGY CLINIC | Facility: CLINIC | Age: 48
End: 2018-09-26
Payer: COMMERCIAL

## 2018-09-26 VITALS
BODY MASS INDEX: 25.87 KG/M2 | HEART RATE: 80 BPM | WEIGHT: 146 LBS | SYSTOLIC BLOOD PRESSURE: 106 MMHG | HEIGHT: 63 IN | DIASTOLIC BLOOD PRESSURE: 60 MMHG

## 2018-09-26 DIAGNOSIS — E11.65 TYPE 2 DIABETES MELLITUS WITH HYPERGLYCEMIA, WITHOUT LONG-TERM CURRENT USE OF INSULIN (HCC): Primary | ICD-10-CM

## 2018-09-26 PROCEDURE — G0108 DIAB MANAGE TRN  PER INDIV: HCPCS

## 2018-09-27 NOTE — PROGRESS NOTES
Blanche Krishna  :1970 attended Step 4 Diabetic Education:    Date: 2018  Start time: 2p End time: 3pm      /60   Pulse 80   Ht 63\"   Wt 146 lb   BMI 25.86 kg/m²   Weight Change:  Lost 17 lbs since her first visit    HgbA1C (%)   Da information for i-Phones. Android version called  Glucose Kahlil   [email or web address].[email or web address]-- Ana Lilia that enables you to email a blood glucose log For iOS users. LoseIt!-- Look up foods, for weight loss. Tracks calories, has weight loss goal. Ana Lilia and  on line.    National Diab

## 2018-10-14 DIAGNOSIS — E11.65 TYPE 2 DIABETES MELLITUS WITH HYPERGLYCEMIA, WITHOUT LONG-TERM CURRENT USE OF INSULIN (HCC): ICD-10-CM

## 2018-10-15 RX ORDER — DAPAGLIFLOZIN 5 MG/1
TABLET, FILM COATED ORAL
Qty: 90 TABLET | Refills: 0 | Status: SHIPPED | OUTPATIENT
Start: 2018-10-15 | End: 2019-01-15

## 2018-11-02 VITALS
DIASTOLIC BLOOD PRESSURE: 80 MMHG | RESPIRATION RATE: 15 BRPM | OXYGEN SATURATION: 96 % | WEIGHT: 170.19 LBS | TEMPERATURE: 98.6 F | BODY MASS INDEX: 30.16 KG/M2 | HEIGHT: 63 IN | HEART RATE: 114 BPM | SYSTOLIC BLOOD PRESSURE: 110 MMHG

## 2018-11-05 VITALS
RESPIRATION RATE: 12 BRPM | BODY MASS INDEX: 31.37 KG/M2 | OXYGEN SATURATION: 97 % | HEART RATE: 88 BPM | HEIGHT: 63 IN | TEMPERATURE: 97.8 F | WEIGHT: 177.03 LBS

## 2018-11-07 ENCOUNTER — NURSE ONLY (OUTPATIENT)
Dept: ENDOCRINOLOGY CLINIC | Facility: CLINIC | Age: 48
End: 2018-11-07
Payer: COMMERCIAL

## 2018-11-07 VITALS
HEIGHT: 63 IN | BODY MASS INDEX: 25.96 KG/M2 | SYSTOLIC BLOOD PRESSURE: 122 MMHG | DIASTOLIC BLOOD PRESSURE: 68 MMHG | HEART RATE: 80 BPM | WEIGHT: 146.5 LBS

## 2018-11-07 DIAGNOSIS — E11.65 TYPE 2 DIABETES MELLITUS WITH HYPERGLYCEMIA, WITHOUT LONG-TERM CURRENT USE OF INSULIN (HCC): Primary | ICD-10-CM

## 2018-11-07 PROCEDURE — G0108 DIAB MANAGE TRN  PER INDIV: HCPCS

## 2018-11-07 NOTE — PROGRESS NOTES
Blanche Krishna  : 1970 was seen for Diabetic Education Follow up:    Date: 2018 Start time: 2pm End time: 3pm    Assessment:     Assessment: /68   Pulse 80   Ht 63\"   Wt 146 lb 8 oz   BMI 25.95 kg/m²      HgbA1C (%)   Date Value -American 96 >=60    AST 13 (L) 15 - 41 U/L    Alt 31 14 - 54 U/L    Alkaline Phosphatase 89 39 - 100 U/L    Bilirubin, Total 0.6 0.1 - 2.0 mg/dL    Total Protein 8.2 6.1 - 8.3 g/dL    Albumin 4.2 3.5 - 4.8 g/dL    Globulin  4.0 2.5 - 4.0 g/dL    A/

## 2018-11-27 NOTE — MR AVS SNAPSHOT
Lisa Moscoso  10 [facility] 100  [address] [id number] [phone]               Thank you for choosing us for your health care visit with Verna Tello MD.  We are glad to serve you and happy to provide you with this sum Assoc Dx: Well woman exam with routine gynecological exam [S81.344]           AFFIRM    Complete by:   Mar 16, 2017 (Approximate)    Assoc Dx:  Vaginitis and vulvovaginitis [N76.0]                 Follow-up Instructions     Return in about 1 year (around Visit [facility] online at  [facility].tn 21 year old female past medical history of gastritis comes to the emergency room for increasing abdominal pain. patient states that she is also having fever and chills.

## 2018-12-05 ENCOUNTER — OFFICE VISIT (OUTPATIENT)
Dept: FAMILY MEDICINE CLINIC | Facility: CLINIC | Age: 48
End: 2018-12-05
Payer: COMMERCIAL

## 2018-12-05 VITALS
BODY MASS INDEX: 26.22 KG/M2 | WEIGHT: 148 LBS | HEIGHT: 63 IN | TEMPERATURE: 98 F | RESPIRATION RATE: 12 BRPM | SYSTOLIC BLOOD PRESSURE: 130 MMHG | DIASTOLIC BLOOD PRESSURE: 80 MMHG | HEART RATE: 120 BPM

## 2018-12-05 DIAGNOSIS — T78.40XA HYPERSENSITIVITY REACTION, INITIAL ENCOUNTER: Primary | ICD-10-CM

## 2018-12-05 DIAGNOSIS — E11.65 UNCONTROLLED TYPE 2 DIABETES MELLITUS WITH HYPERGLYCEMIA, WITHOUT LONG-TERM CURRENT USE OF INSULIN (HCC): ICD-10-CM

## 2018-12-05 PROCEDURE — 99214 OFFICE O/P EST MOD 30 MIN: CPT | Performed by: FAMILY MEDICINE

## 2018-12-05 RX ORDER — HYDROXYZINE HYDROCHLORIDE 10 MG/1
10 TABLET, FILM COATED ORAL 2 TIMES DAILY PRN
Qty: 30 TABLET | Refills: 0 | Status: CANCELLED | OUTPATIENT
Start: 2018-12-05

## 2018-12-05 RX ORDER — METHYLPREDNISOLONE 4 MG/1
TABLET ORAL
Qty: 1 KIT | Refills: 0 | Status: SHIPPED | OUTPATIENT
Start: 2018-12-05 | End: 2018-12-28 | Stop reason: ALTCHOICE

## 2018-12-05 NOTE — PROGRESS NOTES
058 [facility] Family Medicine Office Note  Chief Complaint:   Patient presents with:  Derm Problem: itchy back      HPI:   This is a 46year old female coming in for rash and diabetes. 1. Rash - The patient complains of a rash.  Has had the rash f HIVES  Ceclor [Cefaclor]       ITCHING  Levaquin [Levofloxa*    FACE FLUSHING    Comment:Eyelid redness  Current Meds:    Current Outpatient Medications:  methylPREDNISolone (MEDROL) 4 MG Oral Tablet Therapy Pack As directed.  Disp: 1 kit Rfl: 0   FARXIGA 5 stool.  NEUROLOGICAL:  Denies headache, dizziness, syncope, numbness or tingling in the extremities. MUSCULOSKELETAL:  Denies muscle, back pain, joint pain or stiffness.     EXAM:   /80 (BP Location: Left arm, Patient Position: Sitting, Cuff Size: ad • methylPREDNISolone (MEDROL) 4 MG Oral Tablet Therapy Pack 1 kit 0     Sig: As directed.        Health Maintenance:  Diabetes Care Nephropathy Screening due on 08/01/1968  Colonoscopy due on 08/01/1968  Annual Physical due on 08/01/1970  Tobacco Cessatio

## 2018-12-06 RX ORDER — HYDROXYZINE HYDROCHLORIDE 10 MG/1
10 TABLET, FILM COATED ORAL 2 TIMES DAILY PRN
Qty: 30 TABLET | Refills: 1 | Status: SHIPPED | OUTPATIENT
Start: 2018-12-06 | End: 2020-10-15

## 2018-12-06 NOTE — TELEPHONE ENCOUNTER
Pt saw dr Clotilde Hooks yesterday and he prescribed 2 meds   One went to pharmacy other one (HydrOXYzine HCl 10 MG Oral Tab) did not  Pt requesting if  can send today

## 2018-12-28 ENCOUNTER — OFFICE VISIT (OUTPATIENT)
Dept: FAMILY MEDICINE CLINIC | Facility: CLINIC | Age: 48
End: 2018-12-28
Payer: COMMERCIAL

## 2018-12-28 VITALS
WEIGHT: 149 LBS | DIASTOLIC BLOOD PRESSURE: 78 MMHG | RESPIRATION RATE: 16 BRPM | HEART RATE: 88 BPM | BODY MASS INDEX: 26.4 KG/M2 | TEMPERATURE: 98 F | SYSTOLIC BLOOD PRESSURE: 116 MMHG | HEIGHT: 63 IN

## 2018-12-28 DIAGNOSIS — B37.3 VAGINAL CANDIDIASIS: ICD-10-CM

## 2018-12-28 DIAGNOSIS — T78.40XD HYPERSENSITIVITY REACTION, SUBSEQUENT ENCOUNTER: Primary | ICD-10-CM

## 2018-12-28 PROCEDURE — 99214 OFFICE O/P EST MOD 30 MIN: CPT | Performed by: FAMILY MEDICINE

## 2018-12-28 RX ORDER — FLUCONAZOLE 150 MG/1
150 TABLET ORAL ONCE
Qty: 1 TABLET | Refills: 1 | Status: SHIPPED | OUTPATIENT
Start: 2018-12-28 | End: 2018-12-28

## 2018-12-28 NOTE — PROGRESS NOTES
[facility] Family Medicine Office Note  Chief Complaint:   Patient presents with:  Derm Problem: follow up       HPI:   This is a 46year old female coming in for rash and vaginal discharge.     1. Rash -patient states that the rash on her back is Rfl: 1   FARXIGA 5 MG Oral Tab TAKE 1 TABLET BY MOUTH DAILY Disp: 90 tablet Rfl: 0   PROAIR  (90 Base) MCG/ACT Inhalation Aero Soln Inhale 2 puffs into the lungs as needed.  Disp:  Rfl: 1   Budesonide-Formoterol Fumarate (SYMBICORT IN) Inhale 2 puffs following:    Height as of this encounter: 63\". Weight as of this encounter: 149 lb. Vital signs reviewed. Appears stated age, well groomed.   Physical Exam:  GEN:  Patient is alert and oriented x3, no apparent distress  HEAD:  Normocephalic, atraumati worsening or changing symptoms. Patient is to call with any side effects or complications from the treatments as a result of today.      Problem List:  Patient Active Problem List:     Contusion of multiple sites of right shoulder and upper arm, initial en

## 2019-01-15 DIAGNOSIS — E11.65 TYPE 2 DIABETES MELLITUS WITH HYPERGLYCEMIA, WITHOUT LONG-TERM CURRENT USE OF INSULIN (HCC): ICD-10-CM

## 2019-01-16 RX ORDER — DAPAGLIFLOZIN 5 MG/1
TABLET, FILM COATED ORAL
Qty: 90 TABLET | Refills: 0 | Status: SHIPPED | OUTPATIENT
Start: 2019-01-16 | End: 2019-04-18

## 2019-01-19 ENCOUNTER — PATIENT OUTREACH (OUTPATIENT)
Dept: FAMILY MEDICINE CLINIC | Facility: CLINIC | Age: 49
End: 2019-01-19

## 2019-01-19 DIAGNOSIS — Z12.11 COLON CANCER SCREENING: Primary | ICD-10-CM

## 2019-01-19 NOTE — PROGRESS NOTES
Please call pt to inquire if pt has had a colonoscopy in the last 10 years and if so who performed it (name and phone #). Please let [facility] know so I can obtain the report.     If pt did not have a colonoscopy please advise them we will leave the FIT stoo

## 2019-01-23 ENCOUNTER — TELEPHONE (OUTPATIENT)
Dept: FAMILY MEDICINE CLINIC | Facility: CLINIC | Age: 49
End: 2019-01-23

## 2019-01-23 DIAGNOSIS — Z12.31 ENCOUNTER FOR SCREENING MAMMOGRAM FOR MALIGNANT NEOPLASM OF BREAST: Primary | ICD-10-CM

## 2019-01-23 NOTE — TELEPHONE ENCOUNTER
Pt stated this was recommended for her to get before her daughter has a baby in March. Pt wanted to get this vaccine done at the end of February. Please advise.

## 2019-01-23 NOTE — TELEPHONE ENCOUNTER
There is not an order for this. Was  recommending this? (I don't see any notes from him)    If she is needing, please have her schedule nurse vs then we can route to  to order prior. Otherwise she is seeing him in march.  Can have done then if she p

## 2019-01-23 NOTE — TELEPHONE ENCOUNTER
Per pt she would like to call back for nurse vs closer to end of feb. Once appt for nurse vs, request can be made for order.

## 2019-04-15 ENCOUNTER — APPOINTMENT (OUTPATIENT)
Dept: LAB | Age: 49
End: 2019-04-15
Attending: FAMILY MEDICINE
Payer: COMMERCIAL

## 2019-04-15 DIAGNOSIS — E11.65 UNCONTROLLED TYPE 2 DIABETES MELLITUS WITH HYPERGLYCEMIA, WITHOUT LONG-TERM CURRENT USE OF INSULIN (HCC): ICD-10-CM

## 2019-04-15 DIAGNOSIS — E11.8 UNCONTROLLED TYPE 2 DIABETES MELLITUS WITH COMPLICATION, WITHOUT LONG-TERM CURRENT USE OF INSULIN (HCC): ICD-10-CM

## 2019-04-15 DIAGNOSIS — E11.65 UNCONTROLLED TYPE 2 DIABETES MELLITUS WITH COMPLICATION, WITHOUT LONG-TERM CURRENT USE OF INSULIN (HCC): ICD-10-CM

## 2019-04-15 PROCEDURE — 80061 LIPID PANEL: CPT | Performed by: FAMILY MEDICINE

## 2019-04-15 PROCEDURE — 82570 ASSAY OF URINE CREATININE: CPT | Performed by: FAMILY MEDICINE

## 2019-04-15 PROCEDURE — 36415 COLL VENOUS BLD VENIPUNCTURE: CPT | Performed by: FAMILY MEDICINE

## 2019-04-15 PROCEDURE — 82043 UR ALBUMIN QUANTITATIVE: CPT | Performed by: FAMILY MEDICINE

## 2019-04-15 PROCEDURE — 83036 HEMOGLOBIN GLYCOSYLATED A1C: CPT | Performed by: FAMILY MEDICINE

## 2019-04-15 PROCEDURE — 80053 COMPREHEN METABOLIC PANEL: CPT | Performed by: FAMILY MEDICINE

## 2019-04-18 DIAGNOSIS — E11.65 TYPE 2 DIABETES MELLITUS WITH HYPERGLYCEMIA, WITHOUT LONG-TERM CURRENT USE OF INSULIN (HCC): ICD-10-CM

## 2019-04-18 RX ORDER — DAPAGLIFLOZIN 5 MG/1
TABLET, FILM COATED ORAL
Qty: 30 TABLET | Refills: 0 | Status: SHIPPED | OUTPATIENT
Start: 2019-04-18 | End: 2019-04-23

## 2019-04-23 ENCOUNTER — OFFICE VISIT (OUTPATIENT)
Dept: FAMILY MEDICINE CLINIC | Facility: CLINIC | Age: 49
End: 2019-04-23
Payer: COMMERCIAL

## 2019-04-23 ENCOUNTER — APPOINTMENT (OUTPATIENT)
Dept: LAB | Age: 49
End: 2019-04-23
Attending: FAMILY MEDICINE
Payer: COMMERCIAL

## 2019-04-23 VITALS
DIASTOLIC BLOOD PRESSURE: 68 MMHG | HEART RATE: 66 BPM | RESPIRATION RATE: 16 BRPM | SYSTOLIC BLOOD PRESSURE: 118 MMHG | TEMPERATURE: 98 F | WEIGHT: 160 LBS | HEIGHT: 63 IN | BODY MASS INDEX: 28.35 KG/M2

## 2019-04-23 DIAGNOSIS — Z00.00 ROUTINE GENERAL MEDICAL EXAMINATION AT A HEALTH CARE FACILITY: Primary | ICD-10-CM

## 2019-04-23 DIAGNOSIS — R63.5 WEIGHT GAIN: ICD-10-CM

## 2019-04-23 DIAGNOSIS — E11.65 TYPE 2 DIABETES MELLITUS WITH HYPERGLYCEMIA, WITHOUT LONG-TERM CURRENT USE OF INSULIN (HCC): ICD-10-CM

## 2019-04-23 DIAGNOSIS — Z12.11 COLON CANCER SCREENING: ICD-10-CM

## 2019-04-23 DIAGNOSIS — Z12.31 ENCOUNTER FOR SCREENING MAMMOGRAM FOR MALIGNANT NEOPLASM OF BREAST: ICD-10-CM

## 2019-04-23 PROCEDURE — 84443 ASSAY THYROID STIM HORMONE: CPT | Performed by: FAMILY MEDICINE

## 2019-04-23 PROCEDURE — 99396 PREV VISIT EST AGE 40-64: CPT | Performed by: FAMILY MEDICINE

## 2019-04-23 PROCEDURE — 36415 COLL VENOUS BLD VENIPUNCTURE: CPT | Performed by: FAMILY MEDICINE

## 2019-04-23 NOTE — PROGRESS NOTES
HPI:   Marly Ricks is a 46year old female who presents for a complete physical exam. Symptoms: denies discharge, itching, burning or dysuria, periods are regular. Patient complains of nothing today. Patient has type 2 diabetes.   Her A1c is much mg/dL   LIPID PANEL   Result Value Ref Range    Cholesterol, Total 162 <200 mg/dL    HDL Cholesterol 51 40 - 59 mg/dL    Triglycerides 103 30 - 149 mg/dL    LDL Cholesterol 90 <100 mg/dL    VLDL 21 0 - 30 mg/dL    Non HDL Chol 111 <130 mg/dL         Alannah Social History:   Social History    Tobacco Use      Smoking status: Current Every Day Smoker        Types: Cigarettes      Smokeless tobacco: Never Used      Tobacco comment: one pack a day    Alcohol use: No      Alcohol/week: 0.0 oz    Drug use:  No AND PLAN:   Ashley Kaba is a 46year old female who presents for a complete physical exam.   Pap and pelvic deferred to gyne. Order for mammogram provided  Self breast exam explained.    Health maintenance, will check: Orders Placed This Encounte

## 2019-05-21 ENCOUNTER — OFFICE VISIT (OUTPATIENT)
Dept: FAMILY MEDICINE CLINIC | Facility: CLINIC | Age: 49
End: 2019-05-21
Payer: COMMERCIAL

## 2019-05-21 VITALS
HEIGHT: 63 IN | BODY MASS INDEX: 28.35 KG/M2 | SYSTOLIC BLOOD PRESSURE: 126 MMHG | DIASTOLIC BLOOD PRESSURE: 82 MMHG | WEIGHT: 160 LBS | TEMPERATURE: 99 F | HEART RATE: 120 BPM | RESPIRATION RATE: 18 BRPM

## 2019-05-21 DIAGNOSIS — B02.9 HERPES ZOSTER WITHOUT COMPLICATION: Primary | ICD-10-CM

## 2019-05-21 PROCEDURE — 99214 OFFICE O/P EST MOD 30 MIN: CPT | Performed by: FAMILY MEDICINE

## 2019-05-21 RX ORDER — CLINDAMYCIN HYDROCHLORIDE 150 MG/1
150 CAPSULE ORAL DAILY
Refills: 0 | COMMUNITY
Start: 2019-05-17 | End: 2020-02-19 | Stop reason: ALTCHOICE

## 2019-05-21 NOTE — PROGRESS NOTES
[facility] Family Medicine Office Note  Chief Complaint:   Patient presents with:  Rash      HPI:   This is a 46year old female coming in for a rash. Has had the rash for about 5 days. The rash is located on the left flank.  Is described as red a needed. Disp:  Rfl:    ibuprofen 200 MG Oral Tab Take 200 mg by mouth every 6 (six) hours as needed for Pain. Disp:  Rfl:    Halobetasol Propionate 0.05 % External Ointment Apply a thin layer to affected area(s). (Patient taking differently: as needed.  A erythematous vesicular rash in dermatomal pattern on left flank and under left breast  NEURO:  CN 2 - 12 grossly intact     ASSESSMENT AND PLAN:   1.  Herpes zoster without complication  -  New onset  -  Start valtrex 1 g TID x 7 days  -  Recommend shingrix

## 2019-06-07 ENCOUNTER — TELEPHONE (OUTPATIENT)
Dept: FAMILY MEDICINE CLINIC | Facility: CLINIC | Age: 49
End: 2019-06-07

## 2019-06-07 RX ORDER — VALACYCLOVIR HYDROCHLORIDE 1 G/1
TABLET, FILM COATED ORAL
Qty: 90 TABLET | Refills: 0 | Status: SHIPPED | OUTPATIENT
Start: 2019-06-07 | End: 2019-09-23

## 2019-06-07 NOTE — TELEPHONE ENCOUNTER
Patient is newer to Dr. Roddy Cotto. He has not prescribed this before.   Patient is asking for ValACYclovir HCl 1 G Oral Tab 90 day supply sent to [address], [address], [phone], [phone]

## 2019-07-30 ENCOUNTER — OFFICE VISIT (OUTPATIENT)
Dept: FAMILY MEDICINE CLINIC | Facility: CLINIC | Age: 49
End: 2019-07-30
Payer: COMMERCIAL

## 2019-07-30 VITALS
RESPIRATION RATE: 18 BRPM | WEIGHT: 164 LBS | SYSTOLIC BLOOD PRESSURE: 110 MMHG | HEART RATE: 94 BPM | BODY MASS INDEX: 29.06 KG/M2 | DIASTOLIC BLOOD PRESSURE: 74 MMHG | HEIGHT: 63 IN

## 2019-07-30 DIAGNOSIS — E11.9 CONTROLLED TYPE 2 DIABETES MELLITUS WITHOUT COMPLICATION, WITHOUT LONG-TERM CURRENT USE OF INSULIN (HCC): Primary | ICD-10-CM

## 2019-07-30 DIAGNOSIS — Z12.11 COLON CANCER SCREENING: ICD-10-CM

## 2019-07-30 DIAGNOSIS — J45.20 MILD INTERMITTENT EXTRINSIC ASTHMA WITHOUT COMPLICATION: ICD-10-CM

## 2019-07-30 PROBLEM — J45.909 EXTRINSIC ASTHMA (HCC): Status: ACTIVE | Noted: 2019-07-30

## 2019-07-30 PROBLEM — J45.909 EXTRINSIC ASTHMA: Status: ACTIVE | Noted: 2019-07-30

## 2019-07-30 PROCEDURE — 99214 OFFICE O/P EST MOD 30 MIN: CPT | Performed by: FAMILY MEDICINE

## 2019-07-30 PROCEDURE — 90732 PPSV23 VACC 2 YRS+ SUBQ/IM: CPT | Performed by: FAMILY MEDICINE

## 2019-07-30 PROCEDURE — 90471 IMMUNIZATION ADMIN: CPT | Performed by: FAMILY MEDICINE

## 2019-07-30 NOTE — PROGRESS NOTES
[facility] Family Medicine Office Note  Chief Complaint:   Patient presents with:  Medication Follow-Up      HPI:   This is a 46year old female coming in for follow up of diabetes and asthma.     1.  Diabetes - The patient presents for recheck of FLUSHING    Comment:Eyelid redness  Current Meds:    Current Outpatient Medications:  valACYclovir HCl 1 G Oral Tab TK 1 T PO D as needed Disp: 90 tablet Rfl: 0   clindamycin HCl 150 MG Oral Cap Take 150 mg by mouth daily.  Disp:  Rfl: 0   Dapagliflozin Pro Height as of this encounter: 63\". Weight as of this encounter: 164 lb. Vital signs reviewed. Appears stated age, well groomed.   Physical Exam:  GEN:  Patient is alert and oriented x3, no apparent distress  HEAD:  Normocephalic, atraumatic  NECK:  Supp Medical education done. Outcome: Patient verbalizes understanding. Patient is notified to call with any questions, complications, allergies, or worsening or changing symptoms.   Patient is to call with any side effects or complications from the treatments

## 2019-08-20 DIAGNOSIS — E11.65 TYPE 2 DIABETES MELLITUS WITH HYPERGLYCEMIA, WITHOUT LONG-TERM CURRENT USE OF INSULIN (HCC): ICD-10-CM

## 2019-08-21 RX ORDER — DAPAGLIFLOZIN 5 MG/1
TABLET, FILM COATED ORAL
Qty: 90 TABLET | Refills: 0 | Status: SHIPPED | OUTPATIENT
Start: 2019-08-21 | End: 2019-11-13

## 2019-09-18 ENCOUNTER — APPOINTMENT (OUTPATIENT)
Dept: LAB | Age: 49
End: 2019-09-18
Attending: FAMILY MEDICINE
Payer: COMMERCIAL

## 2019-09-18 DIAGNOSIS — E11.9 CONTROLLED TYPE 2 DIABETES MELLITUS WITHOUT COMPLICATION, WITHOUT LONG-TERM CURRENT USE OF INSULIN (HCC): ICD-10-CM

## 2019-09-18 LAB
ALBUMIN SERPL-MCNC: 4.2 G/DL (ref 3.4–5)
ALBUMIN/GLOB SERPL: 1.2 {RATIO} (ref 1–2)
ALP LIVER SERPL-CCNC: 76 U/L (ref 41–108)
ALT SERPL-CCNC: 40 U/L (ref 13–56)
ANION GAP SERPL CALC-SCNC: 4 MMOL/L (ref 0–18)
AST SERPL-CCNC: 25 U/L (ref 15–37)
BILIRUB SERPL-MCNC: 0.5 MG/DL (ref 0.1–2)
BUN BLD-MCNC: 21 MG/DL (ref 7–18)
BUN/CREAT SERPL: 28.8 (ref 10–20)
CALCIUM BLD-MCNC: 9.2 MG/DL (ref 8.5–10.1)
CHLORIDE SERPL-SCNC: 107 MMOL/L (ref 98–112)
CO2 SERPL-SCNC: 30 MMOL/L (ref 21–32)
CREAT BLD-MCNC: 0.73 MG/DL (ref 0.55–1.02)
GLOBULIN PLAS-MCNC: 3.5 G/DL (ref 2.8–4.4)
GLUCOSE BLD-MCNC: 102 MG/DL (ref 70–99)
M PROTEIN MFR SERPL ELPH: 7.7 G/DL (ref 6.4–8.2)
OSMOLALITY SERPL CALC.SUM OF ELEC: 295 MOSM/KG (ref 275–295)
POTASSIUM SERPL-SCNC: 4 MMOL/L (ref 3.5–5.1)
SODIUM SERPL-SCNC: 141 MMOL/L (ref 136–145)

## 2019-09-18 PROCEDURE — 80053 COMPREHEN METABOLIC PANEL: CPT | Performed by: FAMILY MEDICINE

## 2019-09-18 PROCEDURE — 83036 HEMOGLOBIN GLYCOSYLATED A1C: CPT | Performed by: FAMILY MEDICINE

## 2019-09-18 PROCEDURE — 36415 COLL VENOUS BLD VENIPUNCTURE: CPT | Performed by: FAMILY MEDICINE

## 2019-09-19 LAB
EST. AVERAGE GLUCOSE BLD GHB EST-MCNC: 148 MG/DL (ref 68–126)
HBA1C MFR BLD HPLC: 6.8 % (ref ?–5.7)

## 2019-09-23 ENCOUNTER — OFFICE VISIT (OUTPATIENT)
Dept: FAMILY MEDICINE CLINIC | Facility: CLINIC | Age: 49
End: 2019-09-23
Payer: COMMERCIAL

## 2019-09-23 VITALS
BODY MASS INDEX: 28.7 KG/M2 | HEIGHT: 63 IN | HEART RATE: 102 BPM | SYSTOLIC BLOOD PRESSURE: 108 MMHG | RESPIRATION RATE: 16 BRPM | WEIGHT: 162 LBS | DIASTOLIC BLOOD PRESSURE: 84 MMHG

## 2019-09-23 DIAGNOSIS — J45.20 MILD INTERMITTENT EXTRINSIC ASTHMA WITHOUT COMPLICATION: ICD-10-CM

## 2019-09-23 DIAGNOSIS — E11.9 CONTROLLED TYPE 2 DIABETES MELLITUS WITHOUT COMPLICATION, WITHOUT LONG-TERM CURRENT USE OF INSULIN (HCC): Primary | ICD-10-CM

## 2019-09-23 DIAGNOSIS — Z23 NEED FOR VACCINATION: ICD-10-CM

## 2019-09-23 PROCEDURE — 90471 IMMUNIZATION ADMIN: CPT | Performed by: FAMILY MEDICINE

## 2019-09-23 PROCEDURE — 90686 IIV4 VACC NO PRSV 0.5 ML IM: CPT | Performed by: FAMILY MEDICINE

## 2019-09-23 PROCEDURE — 99214 OFFICE O/P EST MOD 30 MIN: CPT | Performed by: FAMILY MEDICINE

## 2019-09-23 RX ORDER — BUDESONIDE AND FORMOTEROL FUMARATE DIHYDRATE 160; 4.5 UG/1; UG/1
2 AEROSOL RESPIRATORY (INHALATION) 2 TIMES DAILY
Qty: 3 INHALER | Refills: 0 | Status: SHIPPED | OUTPATIENT
Start: 2019-09-23

## 2019-09-23 RX ORDER — ALBUTEROL SULFATE 90 UG/1
2 AEROSOL, METERED RESPIRATORY (INHALATION) EVERY 4 HOURS PRN
Qty: 3 INHALER | Refills: 0 | Status: SHIPPED | OUTPATIENT
Start: 2019-09-23

## 2019-09-23 RX ORDER — HALOBETASOL PROPIONATE 0.05 %
OINTMENT (GRAM) TOPICAL
Qty: 50 G | Refills: 2 | Status: SHIPPED | OUTPATIENT
Start: 2019-09-23

## 2019-09-23 RX ORDER — VALACYCLOVIR HYDROCHLORIDE 1 G/1
TABLET, FILM COATED ORAL
Qty: 90 TABLET | Refills: 1 | Status: SHIPPED | OUTPATIENT
Start: 2019-09-23 | End: 2021-04-15

## 2019-09-23 NOTE — PROGRESS NOTES
[facility] Family Medicine Office Note  Chief Complaint:   Patient presents with:  Medication Follow-Up      HPI:   This is a 44year old female coming in for follow up of diabetes and asthma.     1.  Diabetes - The patient presents for recheck of ITCHING  Levaquin [Levofloxa*    FACE FLUSHING    Comment:Eyelid redness  Current Meds:    Current Outpatient Medications:  Halobetasol Propionate 0.05 % External Ointment Apply a thin layer to affected area twice daily PRN Disp: 50 g Rfl: 2   Albuterol Moreno extremities. MUSCULOSKELETAL:  Denies muscle, back pain, joint pain or stiffness.   ALLERGIES:  + asthma    EXAM:   /84   Pulse 102   Resp 16   Ht 63\"   Wt 162 lb   BMI 28.70 kg/m²  Estimated body mass index is 28.7 kg/m² as calculated from the foll Sig: TK 1 T PO D as needed       Health Maintenance:  Asthma Action Plan due on 08/01/1968  Asthma Control Test due on 08/01/1968  Tobacco Cessation Counseling 2 Years due on 08/01/1980  Pneumococcal PPSV23 Medium Risk Adult(1 of 1 - PPSV23) due on 08/01/

## 2019-10-09 ENCOUNTER — PATIENT OUTREACH (OUTPATIENT)
Dept: FAMILY MEDICINE CLINIC | Facility: CLINIC | Age: 49
End: 2019-10-09

## 2019-10-09 NOTE — PROGRESS NOTES
Please call pt to advise them they are due for their yearly mammogram.  An order has been placed in [employer]. Patient can call central scheduling at [phone] to schedule this appt.      If patient has had this performed at another location please ask them

## 2019-11-13 DIAGNOSIS — E11.65 TYPE 2 DIABETES MELLITUS WITH HYPERGLYCEMIA, WITHOUT LONG-TERM CURRENT USE OF INSULIN (HCC): ICD-10-CM

## 2019-11-14 ENCOUNTER — TELEPHONE (OUTPATIENT)
Dept: FAMILY MEDICINE CLINIC | Facility: CLINIC | Age: 49
End: 2019-11-14

## 2019-11-14 RX ORDER — DAPAGLIFLOZIN 5 MG/1
TABLET, FILM COATED ORAL
Qty: 90 TABLET | Refills: 0 | Status: SHIPPED | OUTPATIENT
Start: 2019-11-14 | End: 2019-11-18

## 2019-11-19 ENCOUNTER — TELEPHONE (OUTPATIENT)
Dept: FAMILY MEDICINE CLINIC | Facility: CLINIC | Age: 49
End: 2019-11-19

## 2019-11-19 NOTE — TELEPHONE ENCOUNTER
Pt states she's been taking   FARXIGA 5 MG Oral Tab  for a year now and has not had any complications and only has 8 doses left. She states her new insurance [employer] will not cover the rx like her old insurance.  Pt wants an expedited appeal put in by [facility]

## 2019-11-20 NOTE — TELEPHONE ENCOUNTER
Outgoing call to [facility] I spoke with PA Representative, reports Dom Ace was denied, however they will re-run Pa as a tier exception, a determination will be made within 24 hours. [id number]  Patient notified.

## 2019-11-25 ENCOUNTER — TELEPHONE (OUTPATIENT)
Dept: FAMILY MEDICINE CLINIC | Facility: CLINIC | Age: 49
End: 2019-11-25

## 2019-11-25 NOTE — TELEPHONE ENCOUNTER
Pt was notified the letter of appeal was faxed on Friday to Edwin Acevedo. Reference # [id number]. Pt aware we are resending it again.

## 2019-12-18 NOTE — TELEPHONE ENCOUNTER
Call from pt  She said she just spoke with Stephie Taylor of [place] ref#    And that another \"urgent PA\" need to be forwarded to appeal the denial of farxiga    It should state that she has been on farxiga for almost 1.5 year, working well for her    And

## 2019-12-18 NOTE — TELEPHONE ENCOUNTER
LM for pt to cb. Fax rec'd from [employer]. Fax states \"appeal has been denied for Carmela Chalino, decision is upheld. This was based on [address] [facility], [address] [place] [address] [facility].   Pt must try and fail a 3 mo trial of Invokan

## 2019-12-18 NOTE — TELEPHONE ENCOUNTER
I have completed another PA for this via covermymeds providing add'l info as below. Will await response.

## 2019-12-19 NOTE — TELEPHONE ENCOUNTER
Fax rec'd from [facility] regarding the new PA I completed. Fax states they rec'd howeever they already have a denial on file for [address] for this member.  It asks to please follow appeals process outlined in original denial---we had already done this--or co

## 2019-12-19 NOTE — TELEPHONE ENCOUNTER
Pt called back. She sts she called member services as \"is not getting anywhere\". Pt reports frustration with them as they are not very helpful in all of this. She feels she is being told different things no matter who she speaks to there.  She was told t

## 2019-12-24 NOTE — TELEPHONE ENCOUNTER
Multiple additional PA requests for farxiga received again from debbie[facility]jessy sunshine. Printed pt call note below and faxed back to [employer]. Copy at T4 for now in case PA requests continue.

## 2020-02-19 ENCOUNTER — OFFICE VISIT (OUTPATIENT)
Dept: FAMILY MEDICINE CLINIC | Facility: CLINIC | Age: 50
End: 2020-02-19
Payer: COMMERCIAL

## 2020-02-19 VITALS
DIASTOLIC BLOOD PRESSURE: 76 MMHG | BODY MASS INDEX: 30.12 KG/M2 | RESPIRATION RATE: 16 BRPM | SYSTOLIC BLOOD PRESSURE: 120 MMHG | HEIGHT: 63 IN | OXYGEN SATURATION: 98 % | HEART RATE: 100 BPM | WEIGHT: 170 LBS | TEMPERATURE: 98 F

## 2020-02-19 DIAGNOSIS — E11.65 TYPE 2 DIABETES MELLITUS WITH HYPERGLYCEMIA, WITHOUT LONG-TERM CURRENT USE OF INSULIN (HCC): Primary | ICD-10-CM

## 2020-02-19 DIAGNOSIS — Z12.31 ENCOUNTER FOR SCREENING MAMMOGRAM FOR MALIGNANT NEOPLASM OF BREAST: ICD-10-CM

## 2020-02-19 DIAGNOSIS — Z12.11 COLON CANCER SCREENING: ICD-10-CM

## 2020-02-19 DIAGNOSIS — J45.20 MILD INTERMITTENT EXTRINSIC ASTHMA WITHOUT COMPLICATION: ICD-10-CM

## 2020-02-19 PROCEDURE — 99214 OFFICE O/P EST MOD 30 MIN: CPT | Performed by: FAMILY MEDICINE

## 2020-02-19 RX ORDER — DAPAGLIFLOZIN 5 MG/1
5 TABLET, FILM COATED ORAL DAILY
Qty: 90 TABLET | Refills: 1 | Status: SHIPPED | OUTPATIENT
Start: 2020-02-19 | End: 2020-02-25

## 2020-02-19 RX ORDER — DAPAGLIFLOZIN 5 MG/1
TABLET, FILM COATED ORAL DAILY
COMMUNITY
Start: 2020-01-22 | End: 2020-02-19

## 2020-02-24 ENCOUNTER — TELEPHONE (OUTPATIENT)
Dept: FAMILY MEDICINE CLINIC | Facility: CLINIC | Age: 50
End: 2020-02-24

## 2020-02-24 NOTE — TELEPHONE ENCOUNTER
Called to [facility] and spoke with Jun. I asked what the covered alternatives were, Jun stated that Metformin , Invokana and Jardiance are covered.   When I asked if there was an y Quantity limits or dosage restrictions she had stated that only the Metformi

## 2020-02-24 NOTE — TELEPHONE ENCOUNTER
Received letter of determination from [facility], Farxiga tab 5mg has been denied. [id number].     Please advise, thank you

## 2020-02-25 NOTE — TELEPHONE ENCOUNTER
I don't have any other recommendations. I appreciate her wanting to stay on farxiga but cost could be high. It is up to her.

## 2020-02-25 NOTE — TELEPHONE ENCOUNTER
Please understand that you have to order the medication to start the process. Please see pended med.   Thank you

## 2020-02-25 NOTE — TELEPHONE ENCOUNTER
Request to do a PA for Invokana 100mg daily received from [facility]. Form completed and faxed to plan. Letter of determination received Invokana 100mg tabs is approved through 2/25/2021.       Called to [employer], spoke with mariza Carrasco, she voiced und

## 2020-03-11 ENCOUNTER — PATIENT OUTREACH (OUTPATIENT)
Dept: FAMILY MEDICINE CLINIC | Facility: CLINIC | Age: 50
End: 2020-03-11

## 2020-03-11 NOTE — PROGRESS NOTES
Please call pt to advise them they are due for their yearly mammogram.  An order has been placed in [employer]. Patient can call central scheduling at [phone] to schedule this appt.      If patient has had this performed at another location please ask them

## 2020-07-21 ENCOUNTER — TELEPHONE (OUTPATIENT)
Dept: FAMILY MEDICINE CLINIC | Facility: CLINIC | Age: 50
End: 2020-07-21

## 2020-07-21 DIAGNOSIS — E11.65 TYPE 2 DIABETES MELLITUS WITH HYPERGLYCEMIA, WITHOUT LONG-TERM CURRENT USE OF INSULIN (HCC): ICD-10-CM

## 2020-07-21 NOTE — TELEPHONE ENCOUNTER
Pt requested prior authorization from Dr. Kassidy Purvis in order to refill   FARXIGA 5 MG Oral Tab (Discontinued) 90 tablet     Pt said her [facility]  insurance will only pay for refill if she took   Canagliflozin (INVOKANA) 100 MG Oral Tab () 90 tablet     An

## 2020-07-22 RX ORDER — DAPAGLIFLOZIN 5 MG/1
5 TABLET, FILM COATED ORAL DAILY
Qty: 90 TABLET | Refills: 0 | Status: SHIPPED | OUTPATIENT
Start: 2020-07-22 | End: 2020-10-15

## 2020-07-22 NOTE — TELEPHONE ENCOUNTER
We need to resubmit the medication in order to start this process again. Tc to pt to see where to have this refilled. She confirms [facility].     Using copay card 160 a month but now they are raising cost $280  We have done several Prior auths for this and

## 2020-07-23 NOTE — TELEPHONE ENCOUNTER
Instant approval for Farxiga 5 mg via cover [employer],effective today to 7/23/21 pt notified, voices understanding.

## 2020-10-06 ENCOUNTER — LAB ENCOUNTER (OUTPATIENT)
Dept: LAB | Age: 50
End: 2020-10-06
Attending: FAMILY MEDICINE
Payer: COMMERCIAL

## 2020-10-06 DIAGNOSIS — E11.65 TYPE 2 DIABETES MELLITUS WITH HYPERGLYCEMIA, WITHOUT LONG-TERM CURRENT USE OF INSULIN (HCC): ICD-10-CM

## 2020-10-06 PROCEDURE — 83036 HEMOGLOBIN GLYCOSYLATED A1C: CPT

## 2020-10-06 PROCEDURE — 82043 UR ALBUMIN QUANTITATIVE: CPT

## 2020-10-06 PROCEDURE — 80053 COMPREHEN METABOLIC PANEL: CPT

## 2020-10-06 PROCEDURE — 82570 ASSAY OF URINE CREATININE: CPT

## 2020-10-06 PROCEDURE — 80061 LIPID PANEL: CPT

## 2020-10-06 PROCEDURE — 36415 COLL VENOUS BLD VENIPUNCTURE: CPT

## 2020-10-15 ENCOUNTER — OFFICE VISIT (OUTPATIENT)
Dept: FAMILY MEDICINE CLINIC | Facility: CLINIC | Age: 50
End: 2020-10-15
Payer: COMMERCIAL

## 2020-10-15 VITALS
DIASTOLIC BLOOD PRESSURE: 80 MMHG | TEMPERATURE: 97 F | HEART RATE: 100 BPM | HEIGHT: 63 IN | BODY MASS INDEX: 30.12 KG/M2 | SYSTOLIC BLOOD PRESSURE: 136 MMHG | WEIGHT: 170 LBS | RESPIRATION RATE: 20 BRPM

## 2020-10-15 DIAGNOSIS — E11.65 TYPE 2 DIABETES MELLITUS WITH HYPERGLYCEMIA, WITHOUT LONG-TERM CURRENT USE OF INSULIN (HCC): ICD-10-CM

## 2020-10-15 DIAGNOSIS — Z12.31 VISIT FOR SCREENING MAMMOGRAM: ICD-10-CM

## 2020-10-15 DIAGNOSIS — Z23 NEED FOR VACCINATION: ICD-10-CM

## 2020-10-15 DIAGNOSIS — Z00.00 ROUTINE GENERAL MEDICAL EXAMINATION AT A HEALTH CARE FACILITY: Primary | ICD-10-CM

## 2020-10-15 DIAGNOSIS — J45.20 MILD INTERMITTENT EXTRINSIC ASTHMA WITHOUT COMPLICATION: ICD-10-CM

## 2020-10-15 DIAGNOSIS — Z12.11 COLON CANCER SCREENING: ICD-10-CM

## 2020-10-15 PROCEDURE — 90471 IMMUNIZATION ADMIN: CPT | Performed by: FAMILY MEDICINE

## 2020-10-15 PROCEDURE — 3075F SYST BP GE 130 - 139MM HG: CPT | Performed by: FAMILY MEDICINE

## 2020-10-15 PROCEDURE — 99396 PREV VISIT EST AGE 40-64: CPT | Performed by: FAMILY MEDICINE

## 2020-10-15 PROCEDURE — 3008F BODY MASS INDEX DOCD: CPT | Performed by: FAMILY MEDICINE

## 2020-10-15 PROCEDURE — 3079F DIAST BP 80-89 MM HG: CPT | Performed by: FAMILY MEDICINE

## 2020-10-15 PROCEDURE — 90686 IIV4 VACC NO PRSV 0.5 ML IM: CPT | Performed by: FAMILY MEDICINE

## 2020-10-15 PROCEDURE — 99213 OFFICE O/P EST LOW 20 MIN: CPT | Performed by: FAMILY MEDICINE

## 2020-10-15 RX ORDER — HYDROXYZINE HYDROCHLORIDE 10 MG/1
10 TABLET, FILM COATED ORAL 2 TIMES DAILY PRN
Qty: 30 TABLET | Refills: 2 | Status: SHIPPED | OUTPATIENT
Start: 2020-10-15 | End: 2021-04-15

## 2020-10-15 RX ORDER — DAPAGLIFLOZIN 5 MG/1
5 TABLET, FILM COATED ORAL DAILY
Qty: 90 TABLET | Refills: 0 | Status: SHIPPED | OUTPATIENT
Start: 2020-10-15 | End: 2021-01-11

## 2020-10-15 NOTE — PROGRESS NOTES
HPI:   Betty Atkinson is a 44year old female who presents for a complete physical exam. Symptoms: denies discharge, itching, burning or dysuria, periods are regular. Patient complains of nothing today. Patient has type 2 diabetes.   Her A1c is sligh AST 16 15 - 37 U/L    ALT 33 13 - 56 U/L    Alkaline Phosphatase 76 41 - 108 U/L    Bilirubin, Total 0.7 0.1 - 2.0 mg/dL    Total Protein 7.6 6.4 - 8.2 g/dL    Albumin 4.3 3.4 - 5.0 g/dL    Globulin  3.3 2.8 - 4.4 g/dL    A/G Ratio 1.3 1.0 - 2.0    FAST ORAL SURGERY        Family History   Problem Relation Age of Onset   • Hypertension Mother    • Other (Other) Mother         colitis   • Diabetes Brother       Social History:   Social History    Tobacco Use      Smoking status: Current Every Day Smoker is not tender,FROM of the back  EXTREMITIES: no cyanosis, clubbing or edema  NEURO: Oriented times three,cranial nerves are intact,motor and sensory are grossly intact; 2+ knee reflexes bilaterally  VASCULAR: 2 + dorsalis pedal pulses bilaterally    ASSESS VACCINE QUAD PRESERVATIVE FREE 0.5 ML    The patient is asked to return in 3 months for med check

## 2021-01-11 DIAGNOSIS — E11.65 TYPE 2 DIABETES MELLITUS WITH HYPERGLYCEMIA, WITHOUT LONG-TERM CURRENT USE OF INSULIN (HCC): ICD-10-CM

## 2021-01-11 RX ORDER — DAPAGLIFLOZIN 5 MG/1
5 TABLET, FILM COATED ORAL DAILY
Qty: 90 TABLET | Refills: 0 | Status: SHIPPED | OUTPATIENT
Start: 2021-01-11 | End: 2021-04-12

## 2021-03-18 DIAGNOSIS — Z23 NEED FOR VACCINATION: ICD-10-CM

## 2021-03-20 ENCOUNTER — IMMUNIZATION (OUTPATIENT)
Dept: LAB | Age: 51
End: 2021-03-20
Attending: HOSPITALIST
Payer: COMMERCIAL

## 2021-03-20 DIAGNOSIS — Z23 NEED FOR VACCINATION: Primary | ICD-10-CM

## 2021-03-20 PROCEDURE — 0001A SARSCOV2 VAC 30MCG/0.3ML IM: CPT

## 2021-03-22 ENCOUNTER — PATIENT OUTREACH (OUTPATIENT)
Dept: FAMILY MEDICINE CLINIC | Facility: CLINIC | Age: 51
End: 2021-03-22

## 2021-03-22 DIAGNOSIS — Z12.31 ENCOUNTER FOR SCREENING MAMMOGRAM FOR MALIGNANT NEOPLASM OF BREAST: Primary | ICD-10-CM

## 2021-03-22 NOTE — PROGRESS NOTES
Please remind pt to follow up with Dr. Netta Murray for colonoscopy. Please remind pt to complete mammogram and importance of mammograms. Has she had a DM eye exam in the past 12 months? If yes- please see if we can obtain copy of this to update her record.  If

## 2021-03-22 NOTE — PROGRESS NOTES
Pt made aware to follow up for colonoscopy. States she is getting her 2nd COVID vaccine soon and waiting after that to schedule her mammogram for when it is recommended.      Pt states she has not had her exam and was waiting until after her vaccination

## 2021-04-08 DIAGNOSIS — E11.65 TYPE 2 DIABETES MELLITUS WITH HYPERGLYCEMIA, WITHOUT LONG-TERM CURRENT USE OF INSULIN (HCC): ICD-10-CM

## 2021-04-10 ENCOUNTER — IMMUNIZATION (OUTPATIENT)
Dept: LAB | Age: 51
End: 2021-04-10
Attending: HOSPITALIST
Payer: COMMERCIAL

## 2021-04-10 DIAGNOSIS — Z23 NEED FOR VACCINATION: Primary | ICD-10-CM

## 2021-04-10 PROCEDURE — 0002A SARSCOV2 VAC 30MCG/0.3ML IM: CPT

## 2021-04-12 RX ORDER — DAPAGLIFLOZIN 5 MG/1
TABLET, FILM COATED ORAL
Qty: 90 TABLET | Refills: 0 | Status: SHIPPED | OUTPATIENT
Start: 2021-04-12 | End: 2021-07-09

## 2021-04-15 ENCOUNTER — OFFICE VISIT (OUTPATIENT)
Dept: FAMILY MEDICINE CLINIC | Facility: CLINIC | Age: 51
End: 2021-04-15
Payer: COMMERCIAL

## 2021-04-15 VITALS
WEIGHT: 173 LBS | DIASTOLIC BLOOD PRESSURE: 72 MMHG | BODY MASS INDEX: 30.65 KG/M2 | SYSTOLIC BLOOD PRESSURE: 129 MMHG | HEIGHT: 63 IN | HEART RATE: 96 BPM | RESPIRATION RATE: 18 BRPM

## 2021-04-15 DIAGNOSIS — J45.20 MILD INTERMITTENT EXTRINSIC ASTHMA WITHOUT COMPLICATION: ICD-10-CM

## 2021-04-15 DIAGNOSIS — E11.65 TYPE 2 DIABETES MELLITUS WITH HYPERGLYCEMIA, WITHOUT LONG-TERM CURRENT USE OF INSULIN (HCC): Primary | ICD-10-CM

## 2021-04-15 DIAGNOSIS — Z12.11 COLON CANCER SCREENING: ICD-10-CM

## 2021-04-15 PROCEDURE — 3008F BODY MASS INDEX DOCD: CPT | Performed by: FAMILY MEDICINE

## 2021-04-15 PROCEDURE — 3078F DIAST BP <80 MM HG: CPT | Performed by: FAMILY MEDICINE

## 2021-04-15 PROCEDURE — 99214 OFFICE O/P EST MOD 30 MIN: CPT | Performed by: FAMILY MEDICINE

## 2021-04-15 PROCEDURE — 3074F SYST BP LT 130 MM HG: CPT | Performed by: FAMILY MEDICINE

## 2021-04-15 RX ORDER — HYDROXYZINE HYDROCHLORIDE 10 MG/1
10 TABLET, FILM COATED ORAL 2 TIMES DAILY PRN
Qty: 30 TABLET | Refills: 2 | Status: SHIPPED | OUTPATIENT
Start: 2021-04-15

## 2021-04-15 RX ORDER — VALACYCLOVIR HYDROCHLORIDE 1 G/1
TABLET, FILM COATED ORAL
Qty: 90 TABLET | Refills: 1 | Status: SHIPPED | OUTPATIENT
Start: 2021-04-15

## 2021-04-16 NOTE — PROGRESS NOTES
[address] [facility] Family Medicine Office Note  Chief Complaint:   Patient presents with:  Medication Follow-Up      HPI:   This is a 44year old female coming in for follow up of diabetes and asthma.     1.  Diabetes - The patient presents for recheck of SWELLING    Comment:Itching inner ears, mouth, throat  Metformin               SWELLING    Comment:All over swelling, no resp distress  Bactrim [Sulfametho*    HIVES  Ceclor [Cefaclor]       ITCHING  Levaquin [Levofloxa*    FACE FLUSHING    Comment:Eyelid headache, dizziness, syncope, numbness or tingling in the extremities. MUSCULOSKELETAL:  Denies muscle, back pain, joint pain or stiffness.   ALLERGIES:  + asthma    EXAM:   /72 (BP Location: Right arm, Patient Position: Sitting, Cuff Size: adult) MG Oral Tab 30 tablet 2     Sig: Take 1 tablet (10 mg total) by mouth 2 (two) times daily as needed for Itching.    • valACYclovir HCl 1 G Oral Tab 90 tablet 1     Sig: TK 1 T PO D as needed       Health Maintenance:  Asthma Action Plan due on 08/01/1968  A

## 2021-04-20 ENCOUNTER — PATIENT OUTREACH (OUTPATIENT)
Dept: FAMILY MEDICINE CLINIC | Facility: CLINIC | Age: 51
End: 2021-04-20

## 2021-04-21 ENCOUNTER — TELEPHONE (OUTPATIENT)
Dept: FAMILY MEDICINE CLINIC | Facility: CLINIC | Age: 51
End: 2021-04-21

## 2021-04-21 NOTE — TELEPHONE ENCOUNTER
Received letter of determination from [facility], pt's Farxiga 5mg tabs has been approved through 7/23/21.

## 2021-04-22 ENCOUNTER — MED REC SCAN ONLY (OUTPATIENT)
Dept: FAMILY MEDICINE CLINIC | Facility: CLINIC | Age: 51
End: 2021-04-22

## 2021-04-28 NOTE — PROGRESS NOTES
Pt states she is aware she is due for her mammogram, states she call back to schedule this later in the Summer.

## 2021-06-04 ENCOUNTER — TELEPHONE (OUTPATIENT)
Dept: FAMILY MEDICINE CLINIC | Facility: CLINIC | Age: 51
End: 2021-06-04

## 2021-06-08 NOTE — TELEPHONE ENCOUNTER
Pt made aware that there is an order in the system. States that she is waiting a couple months to get her mammogram since she had her 2nd covid vaccine.

## 2021-06-10 NOTE — PROGRESS NOTES
[address] [facility] Family Medicine Office Note  Chief Complaint:   Patient presents with:  Medication Follow-Up: DM check      HPI:   This is a 44year old female coming in for follow up of diabetes and asthma.     1.  Diabetes - The patient presents for SWELLING    Comment:All over swelling, no resp distress  Bactrim [Sulfametho*    HIVES  Ceclor [Cefaclor]       ITCHING  Levaquin [Levofloxa*    FACE FLUSHING    Comment:Eyelid redness  Current Meds:  Current Outpatient Medications   Medication Sig Pulse 100   Temp 98 °F (36.7 °C)   Resp 16   Ht 63\"   Wt 170 lb (77.1 kg)   SpO2 98%   BMI 30.11 kg/m²  Estimated body mass index is 30.11 kg/m² as calculated from the following:    Height as of this encounter: 63\".     Weight as of this encounter: 170 lb 08/01/2018  Diabetes Care Dilated Eye Exam due on 06/15/2019  Annual Depression Screen due on 06/25/2019    Patient/Caregiver Education: Patient/Caregiver Education: There are no barriers to learning. Medical education done.    Outcome: Patient verbalizes u There are no Wet Read(s) to document.

## 2021-07-07 DIAGNOSIS — E11.65 TYPE 2 DIABETES MELLITUS WITH HYPERGLYCEMIA, WITHOUT LONG-TERM CURRENT USE OF INSULIN (HCC): ICD-10-CM

## 2021-07-09 ENCOUNTER — TELEPHONE (OUTPATIENT)
Dept: FAMILY MEDICINE CLINIC | Facility: CLINIC | Age: 51
End: 2021-07-09

## 2021-07-09 RX ORDER — DAPAGLIFLOZIN 5 MG/1
TABLET, FILM COATED ORAL
Qty: 90 TABLET | Refills: 0 | Status: SHIPPED | OUTPATIENT
Start: 2021-07-09 | End: 2021-09-30

## 2021-07-09 NOTE — TELEPHONE ENCOUNTER
Pt requesting refill of:    FARXIGA 5 MG Oral Tab    To   Case [id number] [facility] [address] 11, [phone], [phone]      Pt has allergic reaction to inactive ingredients in other similar

## 2021-07-19 ENCOUNTER — TELEPHONE (OUTPATIENT)
Dept: FAMILY MEDICINE CLINIC | Facility: CLINIC | Age: 51
End: 2021-07-19

## 2021-07-23 NOTE — TELEPHONE ENCOUNTER
Pt calling regarding the refill for empagliflozin (JARDIANCE) 10 MG Oral Tab. Pt states she does not currently take this medication as it gives her a bad reaction.     Pt is questioning why this was sent to her pharmacy and filled for her when she had no

## 2021-07-23 NOTE — TELEPHONE ENCOUNTER
Looks like it was sent as an auto refill, I did cancel at the [facility].  Looks like she had a rash when taking that (see 12/5/2018 OV)   I did place drug on allergy list and discontinued on list.

## 2021-07-23 NOTE — TELEPHONE ENCOUNTER
Mayra Arcos was sent because insurance was covering [address] and stated that jardiance was covered. If she had previous reaction, we can do prior authorization for farxiga.

## 2021-07-26 NOTE — TELEPHONE ENCOUNTER
[facility] Prior Authorization (PA) Line called, spoke with [facility]    RE: FARXIGA 5 MG Oral Tab-->Sig: TAKE 1 TABLET(5 MG) BY MOUTH DAILY    Wendy Soliz was a previous PA approval from 7/23/20-7/23/21-->case ID# [id number]]    I initiated and PA/verbal  Case# [id number]

## 2021-07-29 ENCOUNTER — MED REC SCAN ONLY (OUTPATIENT)
Dept: FAMILY MEDICINE CLINIC | Facility: CLINIC | Age: 51
End: 2021-07-29

## 2021-09-27 ENCOUNTER — TELEPHONE (OUTPATIENT)
Dept: FAMILY MEDICINE CLINIC | Facility: CLINIC | Age: 51
End: 2021-09-27

## 2021-09-27 NOTE — TELEPHONE ENCOUNTER
Pt notified lab orders are in the computer to have done now. They were due in April. Pt was notified to fast 12 hours prior water only.  Pt. Agreed to plan and verbalized understanding

## 2021-09-27 NOTE — TELEPHONE ENCOUNTER
Pt scheduled diabetes follow up for 9/30. Pt wondering if any lab work is needed before this appointment. Labs currently in chart are expected for 4/15/22 and pt unaware if these should be completed sooner?     Please Advise

## 2021-09-29 ENCOUNTER — LAB ENCOUNTER (OUTPATIENT)
Dept: LAB | Age: 51
End: 2021-09-29
Attending: FAMILY MEDICINE
Payer: COMMERCIAL

## 2021-09-29 DIAGNOSIS — E11.65 TYPE 2 DIABETES MELLITUS WITH HYPERGLYCEMIA, WITHOUT LONG-TERM CURRENT USE OF INSULIN (HCC): ICD-10-CM

## 2021-09-29 PROCEDURE — 82043 UR ALBUMIN QUANTITATIVE: CPT

## 2021-09-29 PROCEDURE — 80053 COMPREHEN METABOLIC PANEL: CPT

## 2021-09-29 PROCEDURE — 83036 HEMOGLOBIN GLYCOSYLATED A1C: CPT

## 2021-09-29 PROCEDURE — 36415 COLL VENOUS BLD VENIPUNCTURE: CPT

## 2021-09-29 PROCEDURE — 80061 LIPID PANEL: CPT

## 2021-09-29 PROCEDURE — 82570 ASSAY OF URINE CREATININE: CPT

## 2021-09-30 ENCOUNTER — OFFICE VISIT (OUTPATIENT)
Dept: FAMILY MEDICINE CLINIC | Facility: CLINIC | Age: 51
End: 2021-09-30
Payer: COMMERCIAL

## 2021-09-30 VITALS
WEIGHT: 163 LBS | DIASTOLIC BLOOD PRESSURE: 72 MMHG | TEMPERATURE: 98 F | HEART RATE: 90 BPM | RESPIRATION RATE: 16 BRPM | HEIGHT: 63 IN | SYSTOLIC BLOOD PRESSURE: 116 MMHG | BODY MASS INDEX: 28.88 KG/M2

## 2021-09-30 DIAGNOSIS — E78.5 HYPERLIPIDEMIA, UNSPECIFIED HYPERLIPIDEMIA TYPE: ICD-10-CM

## 2021-09-30 DIAGNOSIS — Z23 NEED FOR VACCINATION: ICD-10-CM

## 2021-09-30 DIAGNOSIS — E11.65 TYPE 2 DIABETES MELLITUS WITH HYPERGLYCEMIA, WITHOUT LONG-TERM CURRENT USE OF INSULIN (HCC): Primary | ICD-10-CM

## 2021-09-30 DIAGNOSIS — Z12.11 COLON CANCER SCREENING: ICD-10-CM

## 2021-09-30 PROCEDURE — 90686 IIV4 VACC NO PRSV 0.5 ML IM: CPT | Performed by: FAMILY MEDICINE

## 2021-09-30 PROCEDURE — 99214 OFFICE O/P EST MOD 30 MIN: CPT | Performed by: FAMILY MEDICINE

## 2021-09-30 PROCEDURE — 90471 IMMUNIZATION ADMIN: CPT | Performed by: FAMILY MEDICINE

## 2021-09-30 RX ORDER — DAPAGLIFLOZIN 5 MG/1
5 TABLET, FILM COATED ORAL DAILY
Qty: 90 TABLET | Refills: 1 | Status: CANCELLED | OUTPATIENT
Start: 2021-09-30

## 2021-09-30 RX ORDER — DAPAGLIFLOZIN 5 MG/1
5 TABLET, FILM COATED ORAL DAILY
Qty: 90 TABLET | Refills: 0 | Status: SHIPPED | OUTPATIENT
Start: 2021-09-30 | End: 2021-12-29

## 2021-09-30 NOTE — PROGRESS NOTES
[facility] Family Medicine Office Note  Chief Complaint:   Patient presents with:  Diabetes: medication refill Judah Cornea      HPI:   This is a 46year old female coming in for follow up of diabetes and hyperlipidemia.     1.  Diabetes - The patient SWELLING    Comment:All over swelling, no resp distress  Bactrim [Sulfametho*    HIVES  Ceclor [Cefaclor]       ITCHING  Levaquin [Levofloxa*    FACE FLUSHING    Comment:Eyelid redness  Januvia [Sitaglipti*    RASH  Jardiance [Empaglif*    PAO pain or stiffness.   ALLERGIES:  + asthma    EXAM:   /72 (BP Location: Left arm, Patient Position: Sitting, Cuff Size: adult)   Pulse 90   Temp 97.8 °F (36.6 °C) (Oral)   Resp 16   Ht 5' 3\" (1.6 m)   Wt 163 lb (73.9 kg)   BMI 28.87 kg/m²  Estimated b daughter with ulcerative colitis  -  FIT ordered      Meds & Refills for this Visit:  Requested Prescriptions     Signed Prescriptions Disp Refills   • FARXIGA 5 MG Oral Tab 90 tablet 0     Sig: Take 1 tablet (5 mg total) by mouth daily.        Health Maint

## 2021-10-30 ENCOUNTER — IMMUNIZATION (OUTPATIENT)
Dept: LAB | Facility: HOSPITAL | Age: 51
End: 2021-10-30
Attending: EMERGENCY MEDICINE
Payer: COMMERCIAL

## 2021-10-30 DIAGNOSIS — Z23 NEED FOR VACCINATION: Primary | ICD-10-CM

## 2021-10-30 PROCEDURE — 0004A SARSCOV2 VAC 30MCG/0.3ML IM: CPT

## 2021-12-29 ENCOUNTER — TELEPHONE (OUTPATIENT)
Dept: FAMILY MEDICINE CLINIC | Facility: CLINIC | Age: 51
End: 2021-12-29

## 2021-12-29 DIAGNOSIS — E11.65 TYPE 2 DIABETES MELLITUS WITH HYPERGLYCEMIA, WITHOUT LONG-TERM CURRENT USE OF INSULIN (HCC): Primary | ICD-10-CM

## 2021-12-29 DIAGNOSIS — E11.65 TYPE 2 DIABETES MELLITUS WITH HYPERGLYCEMIA, WITHOUT LONG-TERM CURRENT USE OF INSULIN (HCC): ICD-10-CM

## 2021-12-29 RX ORDER — DAPAGLIFLOZIN 5 MG/1
TABLET, FILM COATED ORAL
Qty: 90 TABLET | Refills: 0 | Status: SHIPPED | OUTPATIENT
Start: 2021-12-29

## 2021-12-29 NOTE — TELEPHONE ENCOUNTER
Please check with patient first as I believe she may have tried other medications in that class in the past and did not do well with them.

## 2021-12-29 NOTE — TELEPHONE ENCOUNTER
Call to pt w info from debbie Fraser Seek not covered by ins. Pt sts she has a prior auth for farxiga 5 mg that is good until   7/26/22. [facility] pharmacy told her they are filling order. Advised will call pharmacy to clarify and will call back.  Patient v

## 2021-12-29 NOTE — TELEPHONE ENCOUNTER
Fax received per insurance [place] is not covered but Jardiance tabs are. Please change if agreeable.

## 2022-03-28 RX ORDER — DAPAGLIFLOZIN 5 MG/1
TABLET, FILM COATED ORAL
Qty: 90 TABLET | Refills: 0 | Status: SHIPPED | OUTPATIENT
Start: 2022-03-28

## 2022-03-30 ENCOUNTER — TELEPHONE (OUTPATIENT)
Dept: FAMILY MEDICINE CLINIC | Facility: CLINIC | Age: 52
End: 2022-03-30

## 2022-03-30 NOTE — TELEPHONE ENCOUNTER
He has aPlease call patient to see if she is okay to switch to [place]. I believe he taking Eduardo Stammer because of having side effects from the other medications in the same class. If she is okay to switch, I will sign the medication and please send it back to me.

## 2022-03-30 NOTE — TELEPHONE ENCOUNTER
S/W pt and she states that she has approval for [place] until July. States they always do this evreytime she ask for a refill. She already called for . Will talk to them when she gets there.

## 2022-03-30 NOTE — TELEPHONE ENCOUNTER
We received fax for [place] alternative. Pharmacy said that insurance prefers [place]. Please advise.

## 2022-05-19 ENCOUNTER — TELEPHONE (OUTPATIENT)
Dept: SCHEDULING | Age: 52
End: 2022-05-19

## 2022-05-19 DIAGNOSIS — Z13.6 SCREENING FOR CARDIOVASCULAR CONDITION: Primary | ICD-10-CM

## 2022-06-25 DIAGNOSIS — E11.65 TYPE 2 DIABETES MELLITUS WITH HYPERGLYCEMIA, WITHOUT LONG-TERM CURRENT USE OF INSULIN (HCC): ICD-10-CM

## 2022-06-28 RX ORDER — DAPAGLIFLOZIN 5 MG/1
TABLET, FILM COATED ORAL
Qty: 90 TABLET | Refills: 0 | Status: SHIPPED | OUTPATIENT
Start: 2022-06-28

## 2022-07-13 ENCOUNTER — LAB ENCOUNTER (OUTPATIENT)
Dept: LAB | Age: 52
End: 2022-07-13
Attending: FAMILY MEDICINE
Payer: COMMERCIAL

## 2022-07-13 DIAGNOSIS — E11.65 TYPE 2 DIABETES MELLITUS WITH HYPERGLYCEMIA, WITHOUT LONG-TERM CURRENT USE OF INSULIN (HCC): ICD-10-CM

## 2022-07-13 LAB
ALBUMIN SERPL-MCNC: 4 G/DL (ref 3.4–5)
ALBUMIN/GLOB SERPL: 1.1 {RATIO} (ref 1–2)
ALP LIVER SERPL-CCNC: 71 U/L
ALT SERPL-CCNC: 31 U/L
ANION GAP SERPL CALC-SCNC: 7 MMOL/L (ref 0–18)
AST SERPL-CCNC: 15 U/L (ref 15–37)
BILIRUB SERPL-MCNC: 0.7 MG/DL (ref 0.1–2)
BUN BLD-MCNC: 20 MG/DL (ref 7–18)
CALCIUM BLD-MCNC: 9.9 MG/DL (ref 8.5–10.1)
CHLORIDE SERPL-SCNC: 106 MMOL/L (ref 98–112)
CHOLEST SERPL-MCNC: 171 MG/DL (ref ?–200)
CO2 SERPL-SCNC: 27 MMOL/L (ref 21–32)
CREAT BLD-MCNC: 0.86 MG/DL
CREAT UR-SCNC: 38.2 MG/DL
EST. AVERAGE GLUCOSE BLD GHB EST-MCNC: 169 MG/DL (ref 68–126)
FASTING PATIENT LIPID ANSWER: YES
FASTING STATUS PATIENT QL REPORTED: YES
GLOBULIN PLAS-MCNC: 3.7 G/DL (ref 2.8–4.4)
GLUCOSE BLD-MCNC: 143 MG/DL (ref 70–99)
HBA1C MFR BLD: 7.5 % (ref ?–5.7)
HDLC SERPL-MCNC: 49 MG/DL (ref 40–59)
LDLC SERPL CALC-MCNC: 101 MG/DL (ref ?–100)
MICROALBUMIN UR-MCNC: 0.54 MG/DL
MICROALBUMIN/CREAT 24H UR-RTO: 14.1 UG/MG (ref ?–30)
NONHDLC SERPL-MCNC: 122 MG/DL (ref ?–130)
OSMOLALITY SERPL CALC.SUM OF ELEC: 295 MOSM/KG (ref 275–295)
POTASSIUM SERPL-SCNC: 4 MMOL/L (ref 3.5–5.1)
PROT SERPL-MCNC: 7.7 G/DL (ref 6.4–8.2)
SODIUM SERPL-SCNC: 140 MMOL/L (ref 136–145)
TRIGL SERPL-MCNC: 114 MG/DL (ref 30–149)
VLDLC SERPL CALC-MCNC: 19 MG/DL (ref 0–30)

## 2022-07-13 PROCEDURE — 3051F HG A1C>EQUAL 7.0%<8.0%: CPT | Performed by: FAMILY MEDICINE

## 2022-07-13 PROCEDURE — 83036 HEMOGLOBIN GLYCOSYLATED A1C: CPT

## 2022-07-13 PROCEDURE — 36415 COLL VENOUS BLD VENIPUNCTURE: CPT

## 2022-07-13 PROCEDURE — 80061 LIPID PANEL: CPT

## 2022-07-13 PROCEDURE — 82043 UR ALBUMIN QUANTITATIVE: CPT

## 2022-07-13 PROCEDURE — 80053 COMPREHEN METABOLIC PANEL: CPT

## 2022-07-13 PROCEDURE — 3061F NEG MICROALBUMINURIA REV: CPT | Performed by: FAMILY MEDICINE

## 2022-07-13 PROCEDURE — 82570 ASSAY OF URINE CREATININE: CPT

## 2022-07-21 ENCOUNTER — OFFICE VISIT (OUTPATIENT)
Dept: FAMILY MEDICINE CLINIC | Facility: CLINIC | Age: 52
End: 2022-07-21
Payer: COMMERCIAL

## 2022-07-21 VITALS
TEMPERATURE: 98 F | SYSTOLIC BLOOD PRESSURE: 120 MMHG | WEIGHT: 170 LBS | BODY MASS INDEX: 30.12 KG/M2 | DIASTOLIC BLOOD PRESSURE: 76 MMHG | HEIGHT: 63 IN | RESPIRATION RATE: 18 BRPM | HEART RATE: 100 BPM

## 2022-07-21 DIAGNOSIS — J45.20 MILD INTERMITTENT EXTRINSIC ASTHMA WITHOUT COMPLICATION: ICD-10-CM

## 2022-07-21 DIAGNOSIS — E11.65 TYPE 2 DIABETES MELLITUS WITH HYPERGLYCEMIA, UNSPECIFIED WHETHER LONG TERM INSULIN USE (HCC): Primary | ICD-10-CM

## 2022-07-21 DIAGNOSIS — Z12.31 ENCOUNTER FOR SCREENING MAMMOGRAM FOR MALIGNANT NEOPLASM OF BREAST: ICD-10-CM

## 2022-07-21 DIAGNOSIS — A60.00 GENITAL HERPES SIMPLEX, UNSPECIFIED SITE: ICD-10-CM

## 2022-07-21 PROCEDURE — 99214 OFFICE O/P EST MOD 30 MIN: CPT | Performed by: FAMILY MEDICINE

## 2022-07-21 PROCEDURE — 3074F SYST BP LT 130 MM HG: CPT | Performed by: FAMILY MEDICINE

## 2022-07-21 PROCEDURE — 3008F BODY MASS INDEX DOCD: CPT | Performed by: FAMILY MEDICINE

## 2022-07-21 PROCEDURE — 3078F DIAST BP <80 MM HG: CPT | Performed by: FAMILY MEDICINE

## 2022-07-21 RX ORDER — HYDROXYZINE HYDROCHLORIDE 10 MG/1
10 TABLET, FILM COATED ORAL 2 TIMES DAILY PRN
Qty: 30 TABLET | Refills: 2 | Status: SHIPPED | OUTPATIENT
Start: 2022-07-21

## 2022-07-21 RX ORDER — BUDESONIDE AND FORMOTEROL FUMARATE DIHYDRATE 160; 4.5 UG/1; UG/1
2 AEROSOL RESPIRATORY (INHALATION) 2 TIMES DAILY
Qty: 3 EACH | Refills: 0 | Status: SHIPPED | OUTPATIENT
Start: 2022-07-21

## 2022-07-21 RX ORDER — VALACYCLOVIR HYDROCHLORIDE 1 G/1
TABLET, FILM COATED ORAL
Qty: 90 TABLET | Refills: 1 | Status: CANCELLED | OUTPATIENT
Start: 2022-07-21

## 2022-07-21 RX ORDER — ALBUTEROL SULFATE 90 UG/1
2 AEROSOL, METERED RESPIRATORY (INHALATION) EVERY 4 HOURS PRN
Qty: 3 EACH | Refills: 1 | Status: SHIPPED | OUTPATIENT
Start: 2022-07-21

## 2022-07-21 RX ORDER — VALACYCLOVIR HYDROCHLORIDE 1 G/1
1000 TABLET, FILM COATED ORAL DAILY
Qty: 30 TABLET | Refills: 1 | Status: SHIPPED | OUTPATIENT
Start: 2022-07-21 | End: 2022-07-26

## 2022-08-09 ENCOUNTER — HOSPITAL ENCOUNTER (OUTPATIENT)
Dept: CT IMAGING | Age: 52
Discharge: HOME | End: 2022-08-09

## 2022-08-09 DIAGNOSIS — Z13.6 SCREENING FOR CARDIOVASCULAR CONDITION: ICD-10-CM

## 2022-08-09 PROCEDURE — 75571 CT HRT W/O DYE W/CA TEST: CPT

## 2022-08-10 ENCOUNTER — TELEPHONE (OUTPATIENT)
Dept: CARDIOLOGY | Age: 52
End: 2022-08-10

## 2022-08-11 ENCOUNTER — TELEPHONE (OUTPATIENT)
Dept: FAMILY MEDICINE CLINIC | Facility: CLINIC | Age: 52
End: 2022-08-11

## 2022-08-11 NOTE — TELEPHONE ENCOUNTER
Heart scan results received and Dr. Lisa Mccabe stated she can either make an appt to go over options or see if she is ok with starting a statin. [facility] sent to notify pt.

## 2022-08-23 ENCOUNTER — OFFICE VISIT (OUTPATIENT)
Facility: LOCATION | Age: 52
End: 2022-08-23
Payer: COMMERCIAL

## 2022-08-23 DIAGNOSIS — R13.10 DYSPHAGIA, UNSPECIFIED TYPE: ICD-10-CM

## 2022-08-23 DIAGNOSIS — J35.01 CHRONIC TONSILLITIS: ICD-10-CM

## 2022-08-23 DIAGNOSIS — J35.1 CHRONIC TONSILLAR HYPERTROPHY: Primary | ICD-10-CM

## 2022-08-23 PROCEDURE — 99213 OFFICE O/P EST LOW 20 MIN: CPT | Performed by: OTOLARYNGOLOGY

## 2022-08-23 PROCEDURE — 31575 DIAGNOSTIC LARYNGOSCOPY: CPT | Performed by: OTOLARYNGOLOGY

## 2022-08-24 ENCOUNTER — TELEPHONE (OUTPATIENT)
Facility: LOCATION | Age: 52
End: 2022-08-24

## 2022-08-24 DIAGNOSIS — I10 HYPERTENSION, UNSPECIFIED TYPE: ICD-10-CM

## 2022-08-24 DIAGNOSIS — J35.01 CHRONIC TONSILLITIS: Primary | ICD-10-CM

## 2022-08-24 NOTE — TELEPHONE ENCOUNTER
Called patient to schedule surgery will put in covid test ekg and surgical case request for 09/23/2022 dilma

## 2022-08-25 ENCOUNTER — TELEPHONE (OUTPATIENT)
Dept: FAMILY MEDICINE CLINIC | Facility: CLINIC | Age: 52
End: 2022-08-25

## 2022-08-25 NOTE — TELEPHONE ENCOUNTER
Pt called to schedule an appt and she's requesting to be seen sooner than the availability you have.     Pt needs to be seen any day other than a Wednesday and preferably any time after 10am.    Please advise

## 2022-08-25 NOTE — TELEPHONE ENCOUNTER
Why does she need to be seen sooner? ? If it is for annual physical exam or med check, has to be next available. If something urgent, let me know.

## 2022-08-26 NOTE — TELEPHONE ENCOUNTER
Pt needs to be seen sooner because she will be getting her tooth extracted on the 8th of Sept and she's getting her tonsils removed later.

## 2022-09-20 ENCOUNTER — EKG ENCOUNTER (OUTPATIENT)
Dept: LAB | Age: 52
End: 2022-09-20
Attending: OTOLARYNGOLOGY

## 2022-09-20 ENCOUNTER — LAB ENCOUNTER (OUTPATIENT)
Dept: LAB | Age: 52
End: 2022-09-20
Attending: OTOLARYNGOLOGY

## 2022-09-20 DIAGNOSIS — I10 HYPERTENSION, UNSPECIFIED TYPE: ICD-10-CM

## 2022-09-20 DIAGNOSIS — J35.01 CHRONIC TONSILLITIS: ICD-10-CM

## 2022-09-20 LAB
ATRIAL RATE: 82 BPM
P AXIS: 66 DEGREES
P-R INTERVAL: 162 MS
Q-T INTERVAL: 374 MS
QRS DURATION: 94 MS
QTC CALCULATION (BEZET): 436 MS
R AXIS: 40 DEGREES
T AXIS: 71 DEGREES
VENTRICULAR RATE: 82 BPM

## 2022-09-20 PROCEDURE — 93010 ELECTROCARDIOGRAM REPORT: CPT | Performed by: INTERNAL MEDICINE

## 2022-09-20 PROCEDURE — 93005 ELECTROCARDIOGRAM TRACING: CPT

## 2022-09-20 NOTE — PAT NURSING NOTE
Called and informed office of Dr. Honorio Broderick regarding patient's contraindications with ancef and clindamycin.

## 2022-09-21 LAB — SARS-COV-2 RNA RESP QL NAA+PROBE: NOT DETECTED

## 2022-09-22 ENCOUNTER — ANESTHESIA EVENT (OUTPATIENT)
Dept: SURGERY | Facility: HOSPITAL | Age: 52
End: 2022-09-22

## 2022-09-23 ENCOUNTER — ANESTHESIA (OUTPATIENT)
Dept: SURGERY | Facility: HOSPITAL | Age: 52
End: 2022-09-23

## 2022-09-23 ENCOUNTER — HOSPITAL ENCOUNTER (OUTPATIENT)
Facility: HOSPITAL | Age: 52
Setting detail: HOSPITAL OUTPATIENT SURGERY
Discharge: HOME | End: 2022-09-23
Attending: OTOLARYNGOLOGY | Admitting: OTOLARYNGOLOGY

## 2022-09-23 VITALS
OXYGEN SATURATION: 94 % | SYSTOLIC BLOOD PRESSURE: 126 MMHG | HEART RATE: 115 BPM | RESPIRATION RATE: 18 BRPM | DIASTOLIC BLOOD PRESSURE: 89 MMHG | HEIGHT: 63 IN | BODY MASS INDEX: 30.48 KG/M2 | WEIGHT: 172 LBS | TEMPERATURE: 98 F

## 2022-09-23 DIAGNOSIS — J35.01 CHRONIC TONSILLITIS: ICD-10-CM

## 2022-09-23 LAB
GLUCOSE BLD-MCNC: 148 MG/DL (ref 70–99)
GLUCOSE BLD-MCNC: 168 MG/DL (ref 70–99)
GLUCOSE BLD-MCNC: 210 MG/DL (ref 70–99)

## 2022-09-23 PROCEDURE — 0CTPXZZ RESECTION OF TONSILS, EXTERNAL APPROACH: ICD-10-PCS | Performed by: OTOLARYNGOLOGY

## 2022-09-23 PROCEDURE — 42826 REMOVAL OF TONSILS: CPT | Performed by: OTOLARYNGOLOGY

## 2022-09-23 RX ORDER — MEPERIDINE HYDROCHLORIDE 25 MG/ML
12.5 INJECTION INTRAMUSCULAR; INTRAVENOUS; SUBCUTANEOUS AS NEEDED
Status: DISCONTINUED | OUTPATIENT
Start: 2022-09-23 | End: 2022-09-23

## 2022-09-23 RX ORDER — NALOXONE HYDROCHLORIDE 0.4 MG/ML
80 INJECTION, SOLUTION INTRAMUSCULAR; INTRAVENOUS; SUBCUTANEOUS AS NEEDED
Status: DISCONTINUED | OUTPATIENT
Start: 2022-09-23 | End: 2022-09-23

## 2022-09-23 RX ORDER — SODIUM CHLORIDE, SODIUM LACTATE, POTASSIUM CHLORIDE, CALCIUM CHLORIDE 600; 310; 30; 20 MG/100ML; MG/100ML; MG/100ML; MG/100ML
INJECTION, SOLUTION INTRAVENOUS CONTINUOUS
Status: DISCONTINUED | OUTPATIENT
Start: 2022-09-23 | End: 2022-09-23

## 2022-09-23 RX ORDER — ONDANSETRON 2 MG/ML
4 INJECTION INTRAMUSCULAR; INTRAVENOUS EVERY 6 HOURS PRN
Status: DISCONTINUED | OUTPATIENT
Start: 2022-09-23 | End: 2022-09-23

## 2022-09-23 RX ORDER — METOCLOPRAMIDE HYDROCHLORIDE 5 MG/ML
INJECTION INTRAMUSCULAR; INTRAVENOUS AS NEEDED
Status: DISCONTINUED | OUTPATIENT
Start: 2022-09-23 | End: 2022-09-23 | Stop reason: SURG

## 2022-09-23 RX ORDER — ACETAMINOPHEN 500 MG
1000 TABLET ORAL ONCE
Status: DISCONTINUED | OUTPATIENT
Start: 2022-09-23 | End: 2022-09-23 | Stop reason: HOSPADM

## 2022-09-23 RX ORDER — IBUPROFEN 600 MG/1
600 TABLET ORAL ONCE AS NEEDED
Status: CANCELLED | OUTPATIENT
Start: 2022-09-23 | End: 2022-09-23

## 2022-09-23 RX ORDER — MIDAZOLAM HYDROCHLORIDE 1 MG/ML
INJECTION INTRAMUSCULAR; INTRAVENOUS AS NEEDED
Status: DISCONTINUED | OUTPATIENT
Start: 2022-09-23 | End: 2022-09-23 | Stop reason: SURG

## 2022-09-23 RX ORDER — BUPIVACAINE HYDROCHLORIDE AND EPINEPHRINE 2.5; 5 MG/ML; UG/ML
INJECTION, SOLUTION EPIDURAL; INFILTRATION; INTRACAUDAL; PERINEURAL AS NEEDED
Status: DISCONTINUED | OUTPATIENT
Start: 2022-09-23 | End: 2022-09-23 | Stop reason: HOSPADM

## 2022-09-23 RX ORDER — HYDROMORPHONE HYDROCHLORIDE 1 MG/ML
0.4 INJECTION, SOLUTION INTRAMUSCULAR; INTRAVENOUS; SUBCUTANEOUS EVERY 5 MIN PRN
Status: DISCONTINUED | OUTPATIENT
Start: 2022-09-23 | End: 2022-09-23

## 2022-09-23 RX ORDER — ESMOLOL HYDROCHLORIDE 10 MG/ML
INJECTION INTRAVENOUS AS NEEDED
Status: DISCONTINUED | OUTPATIENT
Start: 2022-09-23 | End: 2022-09-23 | Stop reason: SURG

## 2022-09-23 RX ORDER — ROCURONIUM BROMIDE 10 MG/ML
INJECTION, SOLUTION INTRAVENOUS AS NEEDED
Status: DISCONTINUED | OUTPATIENT
Start: 2022-09-23 | End: 2022-09-23 | Stop reason: SURG

## 2022-09-23 RX ORDER — SCOLOPAMINE TRANSDERMAL SYSTEM 1 MG/1
1 PATCH, EXTENDED RELEASE TRANSDERMAL ONCE
Status: DISCONTINUED | OUTPATIENT
Start: 2022-09-23 | End: 2022-09-23 | Stop reason: HOSPADM

## 2022-09-23 RX ORDER — DEXAMETHASONE SODIUM PHOSPHATE 4 MG/ML
VIAL (ML) INJECTION AS NEEDED
Status: DISCONTINUED | OUTPATIENT
Start: 2022-09-23 | End: 2022-09-23 | Stop reason: SURG

## 2022-09-23 RX ORDER — ONDANSETRON 2 MG/ML
INJECTION INTRAMUSCULAR; INTRAVENOUS AS NEEDED
Status: DISCONTINUED | OUTPATIENT
Start: 2022-09-23 | End: 2022-09-23 | Stop reason: SURG

## 2022-09-23 RX ORDER — ONDANSETRON 4 MG/1
4 TABLET, ORALLY DISINTEGRATING ORAL EVERY 8 HOURS PRN
Qty: 10 TABLET | Refills: 1 | Status: SHIPPED | OUTPATIENT
Start: 2022-09-23

## 2022-09-23 RX ORDER — ACETAMINOPHEN 500 MG
1000 TABLET ORAL ONCE AS NEEDED
Status: DISCONTINUED | OUTPATIENT
Start: 2022-09-23 | End: 2022-09-23

## 2022-09-23 RX ORDER — NICOTINE POLACRILEX 4 MG
30 LOZENGE BUCCAL
Status: DISCONTINUED | OUTPATIENT
Start: 2022-09-23 | End: 2022-09-23 | Stop reason: HOSPADM

## 2022-09-23 RX ORDER — LABETALOL HYDROCHLORIDE 5 MG/ML
5 INJECTION, SOLUTION INTRAVENOUS EVERY 5 MIN PRN
Status: DISCONTINUED | OUTPATIENT
Start: 2022-09-23 | End: 2022-09-23

## 2022-09-23 RX ORDER — HYDROCODONE BITARTRATE AND ACETAMINOPHEN 5; 325 MG/1; MG/1
1 TABLET ORAL
COMMUNITY
Start: 2022-08-25 | End: 2022-10-03

## 2022-09-23 RX ORDER — GLYCOPYRROLATE 0.2 MG/ML
INJECTION, SOLUTION INTRAMUSCULAR; INTRAVENOUS AS NEEDED
Status: DISCONTINUED | OUTPATIENT
Start: 2022-09-23 | End: 2022-09-23 | Stop reason: SURG

## 2022-09-23 RX ORDER — DEXTROSE MONOHYDRATE 25 G/50ML
50 INJECTION, SOLUTION INTRAVENOUS
Status: DISCONTINUED | OUTPATIENT
Start: 2022-09-23 | End: 2022-09-23 | Stop reason: HOSPADM

## 2022-09-23 RX ORDER — PROCHLORPERAZINE EDISYLATE 5 MG/ML
5 INJECTION INTRAMUSCULAR; INTRAVENOUS EVERY 8 HOURS PRN
Status: DISCONTINUED | OUTPATIENT
Start: 2022-09-23 | End: 2022-09-23

## 2022-09-23 RX ORDER — HYDROMORPHONE HYDROCHLORIDE 1 MG/ML
0.6 INJECTION, SOLUTION INTRAMUSCULAR; INTRAVENOUS; SUBCUTANEOUS EVERY 5 MIN PRN
Status: DISCONTINUED | OUTPATIENT
Start: 2022-09-23 | End: 2022-09-23

## 2022-09-23 RX ORDER — NICOTINE POLACRILEX 4 MG
15 LOZENGE BUCCAL
Status: DISCONTINUED | OUTPATIENT
Start: 2022-09-23 | End: 2022-09-23 | Stop reason: HOSPADM

## 2022-09-23 RX ORDER — HYDROMORPHONE HYDROCHLORIDE 1 MG/ML
0.2 INJECTION, SOLUTION INTRAMUSCULAR; INTRAVENOUS; SUBCUTANEOUS EVERY 5 MIN PRN
Status: DISCONTINUED | OUTPATIENT
Start: 2022-09-23 | End: 2022-09-23

## 2022-09-23 RX ADMIN — SODIUM CHLORIDE, SODIUM LACTATE, POTASSIUM CHLORIDE, CALCIUM CHLORIDE: 600; 310; 30; 20 INJECTION, SOLUTION INTRAVENOUS at 09:56:00

## 2022-09-23 RX ADMIN — DEXAMETHASONE SODIUM PHOSPHATE 4 MG: 4 MG/ML VIAL (ML) INJECTION at 10:14:00

## 2022-09-23 RX ADMIN — MIDAZOLAM HYDROCHLORIDE 2 MG: 1 INJECTION INTRAMUSCULAR; INTRAVENOUS at 10:00:00

## 2022-09-23 RX ADMIN — ESMOLOL HYDROCHLORIDE 30 MG: 10 INJECTION INTRAVENOUS at 10:12:00

## 2022-09-23 RX ADMIN — GLYCOPYRROLATE 0.2 MG: 0.2 INJECTION, SOLUTION INTRAMUSCULAR; INTRAVENOUS at 10:14:00

## 2022-09-23 RX ADMIN — ROCURONIUM BROMIDE 35 MG: 10 INJECTION, SOLUTION INTRAVENOUS at 10:03:00

## 2022-09-23 RX ADMIN — METOCLOPRAMIDE HYDROCHLORIDE 10 MG: 5 INJECTION INTRAMUSCULAR; INTRAVENOUS at 10:14:00

## 2022-09-23 RX ADMIN — ONDANSETRON 4 MG: 2 INJECTION INTRAMUSCULAR; INTRAVENOUS at 10:14:00

## 2022-09-23 NOTE — ANESTHESIA POSTPROCEDURE EVALUATION
[address] [facility] Patient Status:  Hospital Outpatient Surgery   Age/Gender 45year old female MRN [id number]   [facility] SURGERY Attending Alley Byod MD   Hosp Day # 0 PCP Autumn DE LOS SANTOS DO       Anesthesia Post-op Note    Tonsillectomy Bilateral    Procedure Summary     Date: 09/23/22 Room / Location: [facility] OR 02 / [facility] OR    Anesthesia Start: 9970 Anesthesia Stop: 4075    Procedure: Tonsillectomy Bilateral (Bilateral ) Diagnosis:       Chronic tonsillitis      (Chronic tonsillitis [J35.01]tonsil hypertrophy)    Surgeons: Alley Boyd MD Anesthesiologist: Eliel Mckinney MD    Anesthesia Type: general ASA Status: 2          Anesthesia Type: general    Vitals Value Taken Time   /73 09/23/22 1053   Temp 97.4 09/23/22 1053   Pulse 118 09/23/22 1053   Resp 18 09/23/22 1053   SpO2 95 09/23/22 1053       Patient Location: PACU    Anesthesia Type: general    Airway Patency: patent and extubated    Postop Pain Control: sedated until time of extubation    Mental Status: mildly sedated but able to meaningfully participate in the post-anesthesia evaluation    Nausea/Vomiting: none    Cardiopulmonary/Hydration status: stable euvolemic    Complications: no apparent anesthesia related complications    Postop vital signs: stable    Dental Exam: Unchanged from Preop    Patient to be discharged from PACU when criteria met.

## 2022-09-23 NOTE — ANESTHESIA PROCEDURE NOTES
Airway  Date/Time: 9/23/2022 10:06 AM  Urgency: elective    Airway not difficult    General Information and Staff    Patient location during procedure: OR  Anesthesiologist: Eyad Guzmán MD  Performed: anesthesiologist     Indications and Patient Condition  Indications for airway management: anesthesia  Sedation level: deep  Preoxygenated: yes  Patient position: sniffing  Mask difficulty assessment: 2 - vent by mask + OA or adjuvant +/- NMBA    Final Airway Details  Final airway type: endotracheal airway      Successful airway: ETT and oral NIKITA  Cuffed: yes   Successful intubation technique: direct laryngoscopy  Facilitating devices/methods: intubating stylet  Endotracheal tube insertion site: oral  Blade: Adriane  Blade size: #4  ETT size (mm): 7.0    Cormack-Lehane Classification: grade IIA - partial view of glottis  Placement verified by: chest auscultation and capnometry   Measured from: lips  Number of attempts at approach: 1  Ventilation between attempts: none  Number of other approaches attempted: 0    Additional Comments  PreO2. IV induction as noted. Eyes taped. Easy mask. DL x 1 with MAC 4, grade 2 view, atraumatic oral intubation of oral NIKITA 7.0, +ETCO2, +BBS. Taped and secured.

## 2022-09-23 NOTE — BRIEF OP NOTE
Pre-Operative Diagnosis: Chronic tonsillitis [J35.01]tonsil hypertrophy     Post-Operative Diagnosis: Chronic tonsillitis [J35.01]tonsil hypertrophy      Procedure Performed:    Tonsillectomy Bilateral    Surgeon(s) and Role:     Kaleb Rosas MD - Primary    Assistant(s):        Surgical Findings: tonsils 3+ with extensive cryptitis     Specimen: tonsils     Estimated Blood Loss: Blood Output: 25 mL (9/23/2022 10:45 AM)      Dictation Number:  done    Kandy Gonzalez MD  9/23/2022  10:46 AM

## 2022-09-24 NOTE — OPERATIVE REPORT
[address]    PATIENT'S NAME: Ganesh Aguero   ATTENDING PHYSICIAN: Chencho Torrez M.D. OPERATING PHYSICIAN: Chencho Torrez M.D. PATIENT ACCOUNT#:   [de-identified]    LOCATION:  [facility] 11 EDWP 10  MEDICAL RECORD #:   [id number]       YOB: 1970  ADMISSION DATE:       09/23/2022      OPERATION DATE:  09/23/2022    OPERATIVE REPORT    PREOPERATIVE DIAGNOSIS:  Chronic obstructive tonsillitis. POSTOPERATIVE DIAGNOSIS:  Chronic obstructive tonsillitis. PROCEDURE:  Tonsillectomy. ANESTHESIA:  General.    OPERATIVE TECHNIQUE:  Patient was taken to the operating room, placed in supine position. After orotracheal intubation and routine prep and drape, procedure was commenced. Patient was in Brennan position. Murtaza Chant was placed and used to retract the tongue. Examination found each tonsil to be very cryptic. The right tonsil was grasped with Allis forceps. The anterior, superior, and posterior pillars were incised with Coblation forceps. Dissection proceeded down toward the base and then was removed by snare. A like procedure was carried out in the left tonsil. Hemostasis then was achieved with a combination of Coblation and suction cautery. When no further bleeding was seen, approximately 6 mL of 0.5% Marcaine was injected into the tonsillar pillars. Patient was awoken in the operating room after evacuation of the stomach and left the operating room in good condition. Estimated blood loss 25 mL.     Dictated By Chencho Torrez M.D.  d: 09/23/2022 10:51:11  t: 09/23/2022 18:30:10  [facility] [id number]  VX/

## 2022-09-25 DIAGNOSIS — E11.65 TYPE 2 DIABETES MELLITUS WITH HYPERGLYCEMIA, WITHOUT LONG-TERM CURRENT USE OF INSULIN (HCC): ICD-10-CM

## 2022-09-26 ENCOUNTER — TELEPHONE (OUTPATIENT)
Facility: LOCATION | Age: 52
End: 2022-09-26

## 2022-09-26 RX ORDER — HYDROCODONE BITARTRATE AND ACETAMINOPHEN 5; 325 MG/1; MG/1
1 TABLET ORAL EVERY 6 HOURS PRN
Qty: 20 TABLET | Refills: 0 | Status: SHIPPED | OUTPATIENT
Start: 2022-09-26

## 2022-09-26 NOTE — TELEPHONE ENCOUNTER
[facility] PATIENT    Pt's daughter. [facility] called. Pt had Tonsillectomy 9/23/22. Pt's daughter states pt 'is throwing up liquid norco, would like to know if can have a script for norco 5 mg instead? Has also developed a white tongue and has sores around mouth that are peeling- would like to know if this normal?' Please advise.  mp          Pt's daughter [facility] [phone]

## 2022-09-28 RX ORDER — DAPAGLIFLOZIN 5 MG/1
TABLET, FILM COATED ORAL
Qty: 90 TABLET | Refills: 0 | Status: SHIPPED | OUTPATIENT
Start: 2022-09-28

## 2022-09-29 ENCOUNTER — TELEPHONE (OUTPATIENT)
Dept: FAMILY MEDICINE CLINIC | Facility: CLINIC | Age: 52
End: 2022-09-29

## 2022-09-29 NOTE — TELEPHONE ENCOUNTER
Pt's daughter called back in regards to a refill request being denied. The name of the medication is    FARXIGA 5 MG Oral Tab    Per pt's daughter she stated that when this prescription is refilled Dr. Gavin Meckel or a nurse has to call [facility] to get a prior authorization approved.     Please advise

## 2022-09-29 NOTE — TELEPHONE ENCOUNTER
I called number provided from pharmacy ([phone]) for PA. Medication has been approved from 9/29/22-9/29/23. Reference # [id number]. They will fax approval letter.

## 2022-10-03 ENCOUNTER — OFFICE VISIT (OUTPATIENT)
Facility: LOCATION | Age: 52
End: 2022-10-03
Payer: COMMERCIAL

## 2022-10-03 DIAGNOSIS — J35.1 CHRONIC TONSILLAR HYPERTROPHY: ICD-10-CM

## 2022-10-03 DIAGNOSIS — J35.01 CHRONIC TONSILLITIS: Primary | ICD-10-CM

## 2022-10-03 RX ORDER — METHYLPREDNISOLONE 4 MG/1
TABLET ORAL
Qty: 21 TABLET | Refills: 0 | Status: SHIPPED | OUTPATIENT
Start: 2022-10-03

## 2022-10-03 RX ORDER — HYDROCODONE BITARTRATE AND ACETAMINOPHEN 5; 325 MG/1; MG/1
1-2 TABLET ORAL EVERY 8 HOURS PRN
Qty: 30 TABLET | Refills: 0 | Status: SHIPPED | OUTPATIENT
Start: 2022-10-03 | End: 2022-11-02

## 2022-10-03 RX ORDER — HYDROCODONE BITARTRATE AND ACETAMINOPHEN 5; 325 MG/1; MG/1
1-2 TABLET ORAL EVERY 8 HOURS PRN
Qty: 30 TABLET | Refills: 0 | Status: SHIPPED | OUTPATIENT
Start: 2022-12-04 | End: 2022-10-03

## 2022-10-03 RX ORDER — HYDROCODONE BITARTRATE AND ACETAMINOPHEN 5; 325 MG/1; MG/1
1-2 TABLET ORAL EVERY 8 HOURS PRN
Qty: 30 TABLET | Refills: 0 | Status: SHIPPED | OUTPATIENT
Start: 2022-11-03 | End: 2022-10-03

## 2022-10-05 ENCOUNTER — PATIENT OUTREACH (OUTPATIENT)
Dept: FAMILY MEDICINE CLINIC | Facility: CLINIC | Age: 52
End: 2022-10-05

## 2022-10-05 DIAGNOSIS — E11.9 CONTROLLED TYPE 2 DIABETES MELLITUS WITHOUT COMPLICATION, WITHOUT LONG-TERM CURRENT USE OF INSULIN (HCC): Primary | ICD-10-CM

## 2022-10-05 NOTE — PROGRESS NOTES
Outreach made to the pt via [facility] to complete their diabetic exam.   Referral placed.

## 2022-10-06 ENCOUNTER — TELEPHONE (OUTPATIENT)
Dept: FAMILY MEDICINE CLINIC | Facility: CLINIC | Age: 52
End: 2022-10-06

## 2022-10-06 NOTE — TELEPHONE ENCOUNTER
Pt called and states she had received an approval for Farxiga, but when she called Jasmine, states it keeps getting denied. Per pt she only has a few pills left. I called Jasmine to confirm and re ran the script again, it's denied. S/W Kyle Coburn and she will call insurance tomorrow.

## 2022-10-07 NOTE — TELEPHONE ENCOUNTER
Called to [facility] and spoke with Pharmacist Isa Pathak. Isa Pathak stated that he had spoken with pt and insurance and that they are working together on trying to find out why the approval wont go through to the pharmacy. Isa Pathak stated that from what he understands our portion of the PA process is done.

## 2022-10-07 NOTE — TELEPHONE ENCOUNTER
1630 call to [employer]/anita-advised of PA #, auth dates and info noted below from pt. She places me on hold and call is picked up by Lucia/pharm tech. Provided her w same info. She sts pt already picked up prescription today. Advised pt reports pharmacy is stating 90 day prescription will not go thru ins despite auth dated 9/29/22 and good for one year. Benjamin Woo provides claim #[id number]    Call to debbie/mariano/pharm [employer]-she sts is familiar with this case. advised of above info from [employer]. She sts 5 day override was run today so system will not allow her to try running regular prescription-will have to wait until tomorrow. sts she will follow up on this, call to update pt tomorrow and us on Monday 10/10    Call to pt-advised of above info. She voices understanding and will check w pharmacy tomorrow.

## 2022-10-07 NOTE — TELEPHONE ENCOUNTER
1555 call from pt stating pharmacy still can not get Hunter Any order to go thru insurance and is now out of medication. Reviewed previous call info below and advised pt pharmacist was working w ins to determine why med order will not go through. Advised pt I will try to call [facility] or ins for answer re this. Informed will call back-pt is aware call back may not be until Monday 10/10  Patient voices understanding/agrees with plan/no further questions.

## 2022-10-12 ENCOUNTER — TELEPHONE (OUTPATIENT)
Dept: FAMILY MEDICINE CLINIC | Facility: CLINIC | Age: 52
End: 2022-10-12

## 2022-10-12 NOTE — TELEPHONE ENCOUNTER
Per Jasmine, we will need to redo her prior authorization for Danie Tripp. States pt will need to try Jardiance prior prior to approving it even tough it was already approved x 1 year. PA form filled out again and faxed to [phone] urgently.

## 2022-10-14 NOTE — TELEPHONE ENCOUNTER
S/w Talat Lundy. She sts she received a denial letter from her insurance, but it was dated 10/08/22. Per previous notes in this encounter, it appears a new, \"uregent\" PA was faxed on 10/12/22 by RICHMOND Thomas. I located this fax. (in 's \"Prior Auth\" folder\")  A 9 page PA document was faxed by William on 10/12, which included detailed info on prior failed medications, and clinical info, etc.    Pt plans to check with her insurance regarding the status of this PA. She will call us back Monday 10/17 if she needs any additional assistance.

## 2022-10-14 NOTE — TELEPHONE ENCOUNTER
Pt cell [phone]  Pt states she is still having problem with this prior auth. She is almost out of medication. Please advise. Thank you.

## 2023-01-17 ENCOUNTER — PATIENT OUTREACH (OUTPATIENT)
Dept: FAMILY MEDICINE CLINIC | Facility: CLINIC | Age: 53
End: 2023-01-17

## 2023-01-17 DIAGNOSIS — E11.65 TYPE 2 DIABETES MELLITUS WITH HYPERGLYCEMIA, WITHOUT LONG-TERM CURRENT USE OF INSULIN (HCC): ICD-10-CM

## 2023-01-17 NOTE — TELEPHONE ENCOUNTER
Pt would like refill of FARXIGA 5 MG Oral Tab sent to [place] [id number] [facility], [address] [place] [address] [address], [phone], [phone] she will be out Wednesday. Thank you.

## 2023-01-17 NOTE — TELEPHONE ENCOUNTER
FARXIGA 5 MG Oral Tab     LOV: 7/21/22    Rx: 9/28/22 # 90 tablets and 0 refills. Pt will be out of medication by tomorrow. Pt is unable to schedule an appointment with Dr. Yemi Castro until February. Pt will call back to schedule follow-up. Please authorize Rx Farxiga 5 mg pended for 30 days if appropriate.

## 2023-02-07 ENCOUNTER — MED REC SCAN ONLY (OUTPATIENT)
Dept: FAMILY MEDICINE CLINIC | Facility: CLINIC | Age: 53
End: 2023-02-07

## 2023-03-02 ENCOUNTER — OFFICE VISIT (OUTPATIENT)
Dept: FAMILY MEDICINE CLINIC | Facility: CLINIC | Age: 53
End: 2023-03-02
Payer: COMMERCIAL

## 2023-03-02 VITALS
SYSTOLIC BLOOD PRESSURE: 134 MMHG | WEIGHT: 169 LBS | HEIGHT: 63 IN | DIASTOLIC BLOOD PRESSURE: 84 MMHG | TEMPERATURE: 97 F | RESPIRATION RATE: 16 BRPM | BODY MASS INDEX: 29.95 KG/M2 | HEART RATE: 100 BPM

## 2023-03-02 DIAGNOSIS — Z12.11 COLON CANCER SCREENING: ICD-10-CM

## 2023-03-02 DIAGNOSIS — J45.31 MILD PERSISTENT ASTHMA WITH ACUTE EXACERBATION: ICD-10-CM

## 2023-03-02 DIAGNOSIS — A60.00 GENITAL HERPES SIMPLEX, UNSPECIFIED SITE: ICD-10-CM

## 2023-03-02 DIAGNOSIS — E11.65 UNCONTROLLED TYPE 2 DIABETES MELLITUS WITH HYPERGLYCEMIA (HCC): Primary | ICD-10-CM

## 2023-03-02 PROBLEM — S40.021A CONTUSION OF MULTIPLE SITES OF RIGHT SHOULDER AND UPPER ARM, INITIAL ENCOUNTER: Status: RESOLVED | Noted: 2017-11-13 | Resolved: 2023-03-02

## 2023-03-02 PROBLEM — J45.909 EXTRINSIC ASTHMA (HCC): Status: RESOLVED | Noted: 2019-07-30 | Resolved: 2023-03-02

## 2023-03-02 PROBLEM — E11.9 CONTROLLED TYPE 2 DIABETES MELLITUS WITHOUT COMPLICATION, WITHOUT LONG-TERM CURRENT USE OF INSULIN (HCC): Status: RESOLVED | Noted: 2019-07-30 | Resolved: 2023-03-02

## 2023-03-02 PROBLEM — J45.30 MILD PERSISTENT ASTHMA WITHOUT COMPLICATION: Status: ACTIVE | Noted: 2023-03-02

## 2023-03-02 PROBLEM — S40.011A CONTUSION OF MULTIPLE SITES OF RIGHT SHOULDER AND UPPER ARM, INITIAL ENCOUNTER: Status: RESOLVED | Noted: 2017-11-13 | Resolved: 2023-03-02

## 2023-03-02 PROBLEM — R29.898 WEAKNESS OF SHOULDER: Status: RESOLVED | Noted: 2017-11-19 | Resolved: 2023-03-02

## 2023-03-02 PROBLEM — M25.519 SHOULDER PAIN, ACUTE: Status: RESOLVED | Noted: 2017-11-19 | Resolved: 2023-03-02

## 2023-03-02 PROBLEM — J45.909 EXTRINSIC ASTHMA: Status: RESOLVED | Noted: 2019-07-30 | Resolved: 2023-03-02

## 2023-03-02 PROBLEM — J45.30 MILD PERSISTENT ASTHMA WITHOUT COMPLICATION (HCC): Status: ACTIVE | Noted: 2023-03-02

## 2023-03-02 PROCEDURE — 3008F BODY MASS INDEX DOCD: CPT | Performed by: FAMILY MEDICINE

## 2023-03-02 PROCEDURE — 3075F SYST BP GE 130 - 139MM HG: CPT | Performed by: FAMILY MEDICINE

## 2023-03-02 PROCEDURE — 99214 OFFICE O/P EST MOD 30 MIN: CPT | Performed by: FAMILY MEDICINE

## 2023-03-02 PROCEDURE — 3079F DIAST BP 80-89 MM HG: CPT | Performed by: FAMILY MEDICINE

## 2023-03-02 RX ORDER — VALACYCLOVIR HYDROCHLORIDE 1 G/1
1000 TABLET, FILM COATED ORAL DAILY
Qty: 30 TABLET | Refills: 1 | Status: SHIPPED | OUTPATIENT
Start: 2023-03-02 | End: 2023-03-07

## 2023-03-02 RX ORDER — PREDNISONE 20 MG/1
60 TABLET ORAL DAILY
Qty: 15 TABLET | Refills: 0 | Status: SHIPPED | OUTPATIENT
Start: 2023-03-02 | End: 2023-03-07

## 2023-04-11 ENCOUNTER — LAB ENCOUNTER (OUTPATIENT)
Dept: LAB | Age: 53
End: 2023-04-11
Attending: FAMILY MEDICINE
Payer: COMMERCIAL

## 2023-04-11 DIAGNOSIS — E11.65 TYPE 2 DIABETES MELLITUS WITH HYPERGLYCEMIA, WITHOUT LONG-TERM CURRENT USE OF INSULIN (HCC): ICD-10-CM

## 2023-04-11 DIAGNOSIS — E11.65 UNCONTROLLED TYPE 2 DIABETES MELLITUS WITH HYPERGLYCEMIA (HCC): ICD-10-CM

## 2023-04-11 LAB
ALBUMIN SERPL-MCNC: 3.8 G/DL (ref 3.4–5)
ALBUMIN/GLOB SERPL: 1.1 {RATIO} (ref 1–2)
ALP LIVER SERPL-CCNC: 65 U/L
ALT SERPL-CCNC: 32 U/L
ANION GAP SERPL CALC-SCNC: 6 MMOL/L (ref 0–18)
AST SERPL-CCNC: 19 U/L (ref 15–37)
BILIRUB SERPL-MCNC: 0.5 MG/DL (ref 0.1–2)
BUN BLD-MCNC: 19 MG/DL (ref 7–18)
CALCIUM BLD-MCNC: 9.3 MG/DL (ref 8.5–10.1)
CHLORIDE SERPL-SCNC: 108 MMOL/L (ref 98–112)
CHOLEST SERPL-MCNC: 161 MG/DL (ref ?–200)
CO2 SERPL-SCNC: 26 MMOL/L (ref 21–32)
CREAT BLD-MCNC: 0.87 MG/DL
CREAT UR-SCNC: 85 MG/DL
EST. AVERAGE GLUCOSE BLD GHB EST-MCNC: 192 MG/DL (ref 68–126)
FASTING PATIENT LIPID ANSWER: YES
FASTING STATUS PATIENT QL REPORTED: YES
GFR SERPLBLD BASED ON 1.73 SQ M-ARVRAT: 79 ML/MIN/1.73M2 (ref 60–?)
GLOBULIN PLAS-MCNC: 3.4 G/DL (ref 2.8–4.4)
GLUCOSE BLD-MCNC: 149 MG/DL (ref 70–99)
HBA1C MFR BLD: 8.3 % (ref ?–5.7)
HDLC SERPL-MCNC: 54 MG/DL (ref 40–59)
LDLC SERPL CALC-MCNC: 91 MG/DL (ref ?–100)
MICROALBUMIN UR-MCNC: 0.86 MG/DL
MICROALBUMIN/CREAT 24H UR-RTO: 10.1 UG/MG (ref ?–30)
NONHDLC SERPL-MCNC: 107 MG/DL (ref ?–130)
OSMOLALITY SERPL CALC.SUM OF ELEC: 295 MOSM/KG (ref 275–295)
POTASSIUM SERPL-SCNC: 3.9 MMOL/L (ref 3.5–5.1)
PROT SERPL-MCNC: 7.2 G/DL (ref 6.4–8.2)
SODIUM SERPL-SCNC: 140 MMOL/L (ref 136–145)
TRIGL SERPL-MCNC: 83 MG/DL (ref 30–149)
VLDLC SERPL CALC-MCNC: 13 MG/DL (ref 0–30)

## 2023-04-11 PROCEDURE — 82043 UR ALBUMIN QUANTITATIVE: CPT

## 2023-04-11 PROCEDURE — 80061 LIPID PANEL: CPT

## 2023-04-11 PROCEDURE — 80053 COMPREHEN METABOLIC PANEL: CPT

## 2023-04-11 PROCEDURE — 83036 HEMOGLOBIN GLYCOSYLATED A1C: CPT

## 2023-04-11 PROCEDURE — 82570 ASSAY OF URINE CREATININE: CPT

## 2023-04-11 PROCEDURE — 3061F NEG MICROALBUMINURIA REV: CPT | Performed by: FAMILY MEDICINE

## 2023-04-11 PROCEDURE — 36415 COLL VENOUS BLD VENIPUNCTURE: CPT

## 2023-04-11 PROCEDURE — 3052F HG A1C>EQUAL 8.0%<EQUAL 9.0%: CPT | Performed by: FAMILY MEDICINE

## 2023-04-12 DIAGNOSIS — E11.65 TYPE 2 DIABETES MELLITUS WITH HYPERGLYCEMIA, WITHOUT LONG-TERM CURRENT USE OF INSULIN (HCC): Primary | ICD-10-CM

## 2023-04-13 RX ORDER — DAPAGLIFLOZIN 5 MG/1
TABLET, FILM COATED ORAL
Qty: 90 TABLET | Refills: 0 | OUTPATIENT
Start: 2023-04-13

## 2023-06-26 ENCOUNTER — TELEPHONE (OUTPATIENT)
Dept: FAMILY MEDICINE CLINIC | Facility: CLINIC | Age: 53
End: 2023-06-26

## 2023-06-26 DIAGNOSIS — E11.65 UNCONTROLLED TYPE 2 DIABETES MELLITUS WITH HYPERGLYCEMIA (HCC): Primary | ICD-10-CM

## 2023-06-26 NOTE — TELEPHONE ENCOUNTER
See lab result notes from 4/11/23 labs. Pt was advised to see diabetic services at that point d/t worsening A1c. Has not seen them, and no upcoming appts. Coming in to f/u with  per instructions from [address] [place] [address]. Do you want her to repeat any labs prior?

## 2023-07-05 DIAGNOSIS — E11.65 TYPE 2 DIABETES MELLITUS WITH HYPERGLYCEMIA, WITHOUT LONG-TERM CURRENT USE OF INSULIN (HCC): ICD-10-CM

## 2023-07-06 ENCOUNTER — LAB ENCOUNTER (OUTPATIENT)
Dept: LAB | Age: 53
End: 2023-07-06
Attending: FAMILY MEDICINE
Payer: COMMERCIAL

## 2023-07-06 DIAGNOSIS — E11.65 UNCONTROLLED TYPE 2 DIABETES MELLITUS WITH HYPERGLYCEMIA (HCC): ICD-10-CM

## 2023-07-06 LAB
ALBUMIN SERPL-MCNC: 4.1 G/DL (ref 3.4–5)
ALBUMIN/GLOB SERPL: 1.2 {RATIO} (ref 1–2)
ALP LIVER SERPL-CCNC: 73 U/L
ALT SERPL-CCNC: 24 U/L
ANION GAP SERPL CALC-SCNC: 5 MMOL/L (ref 0–18)
AST SERPL-CCNC: 14 U/L (ref 15–37)
BILIRUB SERPL-MCNC: 0.6 MG/DL (ref 0.1–2)
BUN BLD-MCNC: 19 MG/DL (ref 7–18)
CALCIUM BLD-MCNC: 9.2 MG/DL (ref 8.5–10.1)
CHLORIDE SERPL-SCNC: 109 MMOL/L (ref 98–112)
CO2 SERPL-SCNC: 27 MMOL/L (ref 21–32)
CREAT BLD-MCNC: 0.73 MG/DL
EST. AVERAGE GLUCOSE BLD GHB EST-MCNC: 180 MG/DL (ref 68–126)
FASTING STATUS PATIENT QL REPORTED: YES
GFR SERPLBLD BASED ON 1.73 SQ M-ARVRAT: 98 ML/MIN/1.73M2 (ref 60–?)
GLOBULIN PLAS-MCNC: 3.4 G/DL (ref 2.8–4.4)
GLUCOSE BLD-MCNC: 139 MG/DL (ref 70–99)
HBA1C MFR BLD: 7.9 % (ref ?–5.7)
OSMOLALITY SERPL CALC.SUM OF ELEC: 297 MOSM/KG (ref 275–295)
POTASSIUM SERPL-SCNC: 3.9 MMOL/L (ref 3.5–5.1)
PROT SERPL-MCNC: 7.5 G/DL (ref 6.4–8.2)
SODIUM SERPL-SCNC: 141 MMOL/L (ref 136–145)

## 2023-07-06 PROCEDURE — 3051F HG A1C>EQUAL 7.0%<8.0%: CPT | Performed by: FAMILY MEDICINE

## 2023-07-06 PROCEDURE — 80053 COMPREHEN METABOLIC PANEL: CPT

## 2023-07-06 PROCEDURE — 83036 HEMOGLOBIN GLYCOSYLATED A1C: CPT

## 2023-07-06 PROCEDURE — 36415 COLL VENOUS BLD VENIPUNCTURE: CPT

## 2023-07-07 RX ORDER — DAPAGLIFLOZIN 10 MG/1
TABLET, FILM COATED ORAL
Qty: 90 TABLET | Refills: 0 | Status: SHIPPED | OUTPATIENT
Start: 2023-07-07

## 2023-07-10 DIAGNOSIS — E11.65 TYPE 2 DIABETES MELLITUS WITH HYPERGLYCEMIA, WITHOUT LONG-TERM CURRENT USE OF INSULIN (HCC): Primary | ICD-10-CM

## 2023-07-10 NOTE — TELEPHONE ENCOUNTER
LOV: 3/2/23  Next OV: 7/12/23  Last Refill:7/7/23  Medication Quantity Refills Start End   FARXIGA 10 MG Oral Tab 90 tablet 0 7/7/2023    Sig:   TAKE 1 TABLET(10 MG) BY MOUTH DAILY       Per pharmacy, Shannon Powell is not covered by [employer]. Preferred alternative is Jardiance. Jardiance 10mg pended. Please authorize if acceptable. Thank you!

## 2023-07-12 ENCOUNTER — OFFICE VISIT (OUTPATIENT)
Dept: FAMILY MEDICINE CLINIC | Facility: CLINIC | Age: 53
End: 2023-07-12
Payer: COMMERCIAL

## 2023-07-12 VITALS
DIASTOLIC BLOOD PRESSURE: 78 MMHG | WEIGHT: 165 LBS | TEMPERATURE: 97 F | HEIGHT: 63 IN | RESPIRATION RATE: 14 BRPM | HEART RATE: 84 BPM | SYSTOLIC BLOOD PRESSURE: 116 MMHG | BODY MASS INDEX: 29.23 KG/M2

## 2023-07-12 DIAGNOSIS — Z12.31 ENCOUNTER FOR SCREENING MAMMOGRAM FOR MALIGNANT NEOPLASM OF BREAST: ICD-10-CM

## 2023-07-12 DIAGNOSIS — E11.65 UNCONTROLLED TYPE 2 DIABETES MELLITUS WITH HYPERGLYCEMIA (HCC): Primary | ICD-10-CM

## 2023-07-12 DIAGNOSIS — J45.31 MILD PERSISTENT ASTHMA WITH ACUTE EXACERBATION: ICD-10-CM

## 2023-07-12 PROCEDURE — 3074F SYST BP LT 130 MM HG: CPT | Performed by: FAMILY MEDICINE

## 2023-07-12 PROCEDURE — 99214 OFFICE O/P EST MOD 30 MIN: CPT | Performed by: FAMILY MEDICINE

## 2023-07-12 PROCEDURE — 3078F DIAST BP <80 MM HG: CPT | Performed by: FAMILY MEDICINE

## 2023-07-12 PROCEDURE — 3008F BODY MASS INDEX DOCD: CPT | Performed by: FAMILY MEDICINE

## 2023-07-12 RX ORDER — BUDESONIDE AND FORMOTEROL FUMARATE DIHYDRATE 160; 4.5 UG/1; UG/1
2 AEROSOL RESPIRATORY (INHALATION) 2 TIMES DAILY
Qty: 3 EACH | Refills: 0 | Status: SHIPPED | OUTPATIENT
Start: 2023-07-12

## 2023-07-12 RX ORDER — ALBUTEROL SULFATE 90 UG/1
2 AEROSOL, METERED RESPIRATORY (INHALATION) EVERY 4 HOURS PRN
Qty: 3 EACH | Refills: 1 | Status: SHIPPED | OUTPATIENT
Start: 2023-07-12

## 2023-07-18 ENCOUNTER — TELEPHONE (OUTPATIENT)
Dept: FAMILY MEDICINE CLINIC | Facility: CLINIC | Age: 53
End: 2023-07-18

## 2023-07-18 ENCOUNTER — PATIENT MESSAGE (OUTPATIENT)
Dept: FAMILY MEDICINE CLINIC | Facility: CLINIC | Age: 53
End: 2023-07-18

## 2023-07-18 NOTE — TELEPHONE ENCOUNTER
Per Pt's request, attempted to start the renewal PA process for Anna Lino. Spoke w/ [facility] representative who states we can not start renewal PA request no earlier than 60 days from it's expiration. PA expires 10/20/23 so renewal can be requested 8/21/23. [facility] sent to Pt w/ info.

## 2023-07-19 ENCOUNTER — MED REC SCAN ONLY (OUTPATIENT)
Dept: FAMILY MEDICINE CLINIC | Facility: CLINIC | Age: 53
End: 2023-07-19

## 2023-08-23 ENCOUNTER — PATIENT OUTREACH (OUTPATIENT)
Dept: FAMILY MEDICINE CLINIC | Facility: CLINIC | Age: 53
End: 2023-08-23

## 2023-08-23 ENCOUNTER — TELEPHONE (OUTPATIENT)
Dept: FAMILY MEDICINE CLINIC | Facility: CLINIC | Age: 53
End: 2023-08-23

## 2023-08-23 NOTE — PROGRESS NOTES
Spoke with pt re: her Diabetic eye exam.  She sts that she has not had it completed yet due to her mom and  health concerns. However she is going to have it completed.

## 2023-08-23 NOTE — TELEPHONE ENCOUNTER
Spoke with [facility] representative and a PA was completed for a continuation of medication for the pt w/o in any interruptions.    PA confirmation number #[id number]

## 2023-09-29 DIAGNOSIS — E11.65 TYPE 2 DIABETES MELLITUS WITH HYPERGLYCEMIA, WITHOUT LONG-TERM CURRENT USE OF INSULIN (HCC): ICD-10-CM

## 2023-10-02 DIAGNOSIS — E11.65 TYPE 2 DIABETES MELLITUS WITH HYPERGLYCEMIA, WITHOUT LONG-TERM CURRENT USE OF INSULIN (HCC): ICD-10-CM

## 2023-10-02 NOTE — TELEPHONE ENCOUNTER
LOV: 7/12/23  Next OV: 10/18/23  Last Refill:7/7/23  Medication Quantity Refills Start End   FARXIGA 10 MG Oral Tab 90 tablet 0 7/7/2023    Sig:   TAKE 1 TABLET(10 MG) BY MOUTH DAILY         Diabetic Medication Protocol Poryde9909/29/2023 04:07 PM   Protocol Details Last HgBA1C < 7.5    HgBA1C procedure resulted in past 6 months    Microalbumin procedure in past 12 months or taking ACE/ARB    Appointment in past 6 or next 3 months          Please authorize if acceptable. Thank you!

## 2023-10-03 RX ORDER — DAPAGLIFLOZIN 10 MG/1
TABLET, FILM COATED ORAL
Qty: 90 TABLET | Refills: 0 | OUTPATIENT
Start: 2023-10-03

## 2023-10-03 NOTE — TELEPHONE ENCOUNTER
Requested Prescriptions     Pending Prescriptions Disp Refills    FARXIGA 10 MG Oral Tab [Pharmacy Med Name: FARXIGA 10MG TABLETS] 90 tablet 0     Sig: TAKE 1 TABLET(10 MG) BY MOUTH DAILY     Last refill 21/4/60 #28  Duplicate request  Refill denied.

## 2023-10-18 ENCOUNTER — TELEPHONE (OUTPATIENT)
Dept: FAMILY MEDICINE CLINIC | Facility: CLINIC | Age: 53
End: 2023-10-18

## 2023-10-18 NOTE — TELEPHONE ENCOUNTER
Diabetic care gaps -  Diabetic eye exam due. Spoke with patient. Patient notified that she is due for eye exam.  Patient states that she is aware and will get this done when she can. Patient has to get a ride for this appointment.

## 2023-10-24 ENCOUNTER — HOSPITAL ENCOUNTER (OUTPATIENT)
Dept: MAMMOGRAPHY | Age: 53
Discharge: HOME | End: 2023-10-24
Attending: FAMILY MEDICINE

## 2023-10-24 DIAGNOSIS — Z12.31 ENCOUNTER FOR SCREENING MAMMOGRAM FOR MALIGNANT NEOPLASM OF BREAST: ICD-10-CM

## 2023-10-24 PROCEDURE — 77063 BREAST TOMOSYNTHESIS BI: CPT | Performed by: FAMILY MEDICINE

## 2023-10-24 PROCEDURE — 77067 SCR MAMMO BI INCL CAD: CPT | Performed by: FAMILY MEDICINE

## 2023-10-24 NOTE — PROGRESS NOTES
Normal mammogram. Repeat in 1 year. normal... Well appearing, awake, alert, oriented to person, place, time/situation and in no apparent distress.

## 2023-11-03 ENCOUNTER — LAB ENCOUNTER (OUTPATIENT)
Dept: LAB | Age: 53
End: 2023-11-03
Attending: FAMILY MEDICINE
Payer: COMMERCIAL

## 2023-11-03 DIAGNOSIS — E11.65 UNCONTROLLED TYPE 2 DIABETES MELLITUS WITH HYPERGLYCEMIA (HCC): ICD-10-CM

## 2023-11-03 LAB
EST. AVERAGE GLUCOSE BLD GHB EST-MCNC: 180 MG/DL (ref 68–126)
HBA1C MFR BLD: 7.9 % (ref ?–5.7)

## 2023-11-03 PROCEDURE — 83036 HEMOGLOBIN GLYCOSYLATED A1C: CPT

## 2023-11-03 PROCEDURE — 36415 COLL VENOUS BLD VENIPUNCTURE: CPT

## 2023-11-03 PROCEDURE — 3051F HG A1C>EQUAL 7.0%<8.0%: CPT | Performed by: FAMILY MEDICINE

## 2023-11-06 ENCOUNTER — OFFICE VISIT (OUTPATIENT)
Dept: FAMILY MEDICINE CLINIC | Facility: CLINIC | Age: 53
End: 2023-11-06
Payer: COMMERCIAL

## 2023-11-06 VITALS
HEART RATE: 111 BPM | OXYGEN SATURATION: 98 % | WEIGHT: 168 LBS | SYSTOLIC BLOOD PRESSURE: 122 MMHG | HEIGHT: 63 IN | BODY MASS INDEX: 29.77 KG/M2 | RESPIRATION RATE: 16 BRPM | DIASTOLIC BLOOD PRESSURE: 78 MMHG

## 2023-11-06 DIAGNOSIS — E11.65 UNCONTROLLED TYPE 2 DIABETES MELLITUS WITH HYPERGLYCEMIA (HCC): ICD-10-CM

## 2023-11-06 DIAGNOSIS — J45.31 MILD PERSISTENT ASTHMA WITH ACUTE EXACERBATION: ICD-10-CM

## 2023-11-06 DIAGNOSIS — Z12.4 CERVICAL CANCER SCREENING: ICD-10-CM

## 2023-11-06 DIAGNOSIS — Z00.00 ROUTINE GENERAL MEDICAL EXAMINATION AT A HEALTH CARE FACILITY: Primary | ICD-10-CM

## 2023-11-06 PROCEDURE — 99212 OFFICE O/P EST SF 10 MIN: CPT | Performed by: FAMILY MEDICINE

## 2023-11-06 PROCEDURE — 88175 CYTOPATH C/V AUTO FLUID REDO: CPT | Performed by: FAMILY MEDICINE

## 2023-11-06 PROCEDURE — 99396 PREV VISIT EST AGE 40-64: CPT | Performed by: FAMILY MEDICINE

## 2023-11-06 PROCEDURE — 87624 HPV HI-RISK TYP POOLED RSLT: CPT | Performed by: FAMILY MEDICINE

## 2023-11-06 PROCEDURE — 3074F SYST BP LT 130 MM HG: CPT | Performed by: FAMILY MEDICINE

## 2023-11-06 PROCEDURE — 3008F BODY MASS INDEX DOCD: CPT | Performed by: FAMILY MEDICINE

## 2023-11-06 PROCEDURE — 3078F DIAST BP <80 MM HG: CPT | Performed by: FAMILY MEDICINE

## 2023-11-06 RX ORDER — VALACYCLOVIR HYDROCHLORIDE 1 G/1
1 TABLET, FILM COATED ORAL EVERY 12 HOURS SCHEDULED
COMMUNITY

## 2023-11-06 RX ORDER — HYDROXYZINE HYDROCHLORIDE 10 MG/1
10 TABLET, FILM COATED ORAL 2 TIMES DAILY PRN
Qty: 30 TABLET | Refills: 2 | Status: SHIPPED | OUTPATIENT
Start: 2023-11-06

## 2023-11-07 ENCOUNTER — MED REC SCAN ONLY (OUTPATIENT)
Dept: FAMILY MEDICINE CLINIC | Facility: CLINIC | Age: 53
End: 2023-11-07

## 2023-11-10 LAB
.: NORMAL
.: NORMAL

## 2023-11-13 LAB — HPV I/H RISK 1 DNA SPEC QL NAA+PROBE: NEGATIVE

## 2023-12-29 DIAGNOSIS — E11.65 TYPE 2 DIABETES MELLITUS WITH HYPERGLYCEMIA, WITHOUT LONG-TERM CURRENT USE OF INSULIN (HCC): ICD-10-CM

## 2023-12-29 NOTE — TELEPHONE ENCOUNTER
LOV: 11/06/2023    Last Refill:   Medication Quantity Refills Start End   dapagliflozin (FARXIGA) 10 MG Oral Tab 90 tablet 0 10/2/2023 --     RTC: 02/06/2024    Protocol: failed    Refill pended. Please approve if okay. Thank you.

## 2024-03-01 ENCOUNTER — OFFICE VISIT (OUTPATIENT)
Facility: CLINIC | Age: 54
End: 2024-03-01
Payer: COMMERCIAL

## 2024-03-01 ENCOUNTER — TELEPHONE (OUTPATIENT)
Facility: CLINIC | Age: 54
End: 2024-03-01

## 2024-03-01 VITALS
SYSTOLIC BLOOD PRESSURE: 139 MMHG | HEART RATE: 118 BPM | WEIGHT: 167 LBS | DIASTOLIC BLOOD PRESSURE: 91 MMHG | BODY MASS INDEX: 29.59 KG/M2 | HEIGHT: 63 IN

## 2024-03-01 DIAGNOSIS — Z12.11 COLON CANCER SCREENING: Primary | ICD-10-CM

## 2024-03-01 PROCEDURE — S0285 CNSLT BEFORE SCREEN COLONOSC: HCPCS

## 2024-03-01 NOTE — TELEPHONE ENCOUNTER
Scheduled for:  Colonoscopy 25056/60090  Provider Name:  Dr. Pinon  Date:  08/09/2024   Location:[facility]  Sedation:  MAC  Time:  2:15pm (pt is aware that [facility] will call the day before to confirm arrival time)    Prep:  Trilyte Prep Instructions Given At The Office Visit.    Meds/Allergies Reconciled?:  ROMAN Allen Reviewed  Diagnosis with codes:  Colon Screening Z12.11  Was patient informed to call insurance with codes (Y/N):  Yes  Referral sent?:  Referral was sent at the time of electronic surgical scheduling.  [facility] or [facility] notified?:  I sent an electronic request to Endo Scheduling and received a confirmation today.  Medication Orders: Patient is aware to hold farxiga 4 days prior to the procedure. Pt is aware to NOT take iron pills, herbal meds and diet supplements for 7 days before exam. Also to NOT take any form of alcohol, recreational drugs and any forms of ED meds 24 hours before exam.   Misc Orders:       Further instructions given by staff:  I provide prep instructions to patient at the time of the appointment and reviewed date, time and location, she verbalized that she understood and is aware to call if she has any questions.    Patient was informed about the new cancellation policy for his/her procedure. Patient was also given a copy of the cancellation policy at the time of the appointment and verbalized understanding.

## 2024-03-01 NOTE — H&P
[facility] - Gastroenterology                                                                                                  Clinic History and Physical     Chief Complaint   Patient presents with    Consult     No previous CLN       Requesting physician or provider: ZENAIDA DE LOS SANTOS DO    HPI:   Cristy Taylor is a 53 year old year-old female with medical history including diabetes type 2, asthma, who presents for colon cancer screening evaluation.    Patient is here today as a referral from her PCP for evaluation prior to undergoing colonoscopy for CRC screening. Patient denies any GI symptoms of nausea, vomiting, heartburn, dyspepsia, dysphagia, hematemesis, abdominal pain, change in bowel habits, constipation, diarrhea, hematochezia, or melena.  Additionally there is no weight loss and no reported chest pain or shortness of breath.     Pt is due for CRC screening. We discussed the available screening options for CRC such as FIT and cologuard. They are electing to pursue colonoscopy at this time.     Last colonoscopy: none   Last EGD:  none     NSAIDS:occasional  Tobacco: 1/2 PPD  Alcohol: none   Marijuana:none   Illicit drugs:none     FH GI malignancy: maternal uncle- colon cancer,  age 58    YES history of adverse reaction to sedation - post op N/V   No ANETA  No anticoagulants  No pacemaker/defibrillator  No pain medications and/or sleep aides    Wt Readings from Last 6 Encounters:   24 167 lb (75.8 kg)   23 168 lb (76.2 kg)   23 165 lb (74.8 kg)   23 169 lb (76.7 kg)   22 172 lb (78 kg)   22 170 lb (77.1 kg)          History, Medications, Allergies, ROS:      Past Medical History:   Diagnosis Date    Asthma (HCC)     Diabetes (HCC)     Genital herpes simplex     Hair abnormality hirsuitism    Hx of motion sickness     Pap smear for cervical cancer screening     Neg HPV Neg    PONV (postoperative nausea and vomiting)     Visual impairment        Past Surgical History:   Procedure Laterality Date    ORAL SURGERY        Family Hx:   Family History   Problem Relation Age of Onset    Hypertension Mother     Other (Other) Mother         colitis    Diabetes Brother     Colon Cancer Maternal Uncle       Social History:   Social History     Socioeconomic History    Marital status:    Tobacco Use    Smoking status: Every Day     Packs/day: .5     Types: Cigarettes    Smokeless tobacco: Never    Tobacco comments:     one pack a day   Vaping Use    Vaping Use: Never used   Substance and Sexual Activity    Alcohol use: No    Drug use: No    Sexual activity: Yes   Other Topics Concern    Caffeine Concern Yes    Exercise Yes     Comment: active        Medications (Active prior to today's visit):  Current Outpatient Medications   Medication Sig Dispense Refill    dapagliflozin (FARXIGA) 10 MG Oral Tab Take 1 tablet (10 mg total) by mouth daily. 90 tablet 0    valACYclovir 1 G Oral Tab Take 1 mg by mouth every 12 (twelve) hours.      hydrOXYzine 10 MG Oral Tab Take 1 tablet (10 mg total) by mouth 2 (two) times daily as needed for Itching. 30 tablet 2    Budesonide-Formoterol Fumarate (SYMBICORT) 160-4.5 MCG/ACT Inhalation Aerosol Inhale 2 puffs into the lungs 2 (two) times daily. 3 each 0    albuterol (VENTOLIN HFA) 108 (90 Base) MCG/ACT Inhalation Aero Soln Inhale 2 puffs into the lungs every 4 (four) hours as needed for Wheezing. 3 each 1    Halobetasol Propionate 0.05 % External Ointment Apply a thin layer to affected area twice daily PRN 50 g 2       Allergies:  Allergies   Allergen Reactions    Alcohol SWELLING     Itching inner ears, mouth, throat    Codeine SWELLING    Invokana [Canagliflozin] HIVES and ITCHING     Redness      Metformin SWELLING     All over swelling, no resp distress    Bactrim [Sulfamethoxazole W/Trimethoprim] HIVES    Ceclor [Cefaclor] ITCHING    Levaquin [Levofloxacin] FACE FLUSHING     Eyelid redness    Januvia [Sitagliptin] RASH     Jardiance [Empagliflozin] RASH       ROS:   CONSTITUTIONAL: negative for fevers, chills, sweats  EYES Negative for scleral icterus or redness, and diplopia  HEENT: Negative for hoarseness  RESPIRATORY: Negative for cough and severe shortness of breath  CARDIOVASCULAR: Negative for crushing sub-sternal chest pain  GASTROINTESTINAL: See HPI  GENITOURINARY: Negative for dysuria  MUSCULOSKELETAL: Negative for arthralgias and myalgias  SKIN: Negative for jaundice, rash or pruritus  NEUROLOGICAL: Negative for dizziness and headaches  BEHAVIOR/PSYCH: Negative for psychotic behavior      PHYSICAL EXAM:   Blood pressure (!) 139/91, pulse 118, height 5' 3\" (1.6 m), weight 167 lb (75.8 kg), not currently breastfeeding.    GEN: Alert, no acute distress, well-nourished   HEENT: anicteric sclera, neck supple, trachea midline, MMM, no palpable or tender neck or supraclavicular lymph nodes  CV: RRR, the extremities are warm and well perfused   LUNGS: No increased work of breathing, CTAB  ABDOMEN: Soft, symmetrical, non-tender without distention or guarding. No scars or lesions. Aorta is without bruit or visible pulsation. Umbilicus is midline without herniation. Normoactive bowel sounds are present, No masses, hepatomegaly or splenomegaly noted.  MSK: No erythema, no warmth, no swelling of joints  SKIN: No jaundice, no erythema, no rashes, no lesions  HEMATOLOGIC: No bleeding, no bruising  NEURO: Alert and interactive, OTERO  PSYCH: appropriate mood & affect    Labs/Imaging:     Patient's labs and imaging were reviewed and discussed with patient today.    .  ASSESSMENT/PLAN:   Cristy Taylor is a 53 year old year-old female with medical history including diabetes type 2, asthma, who presents for colon cancer screening evaluation.    # Average Risk screening: patient is considered average risk for colon cancer (No immediate family hx of colon cancer) and it is appropriate to proceed with screening colonoscopy. Patient is  currently asymptomatic and denies diarrhea, hematochezia, thin-stools or weight loss. We discussed risks/benefits/alternatives to procedure, including CT colonography and stool testing, they want to proceed with colonoscopy.    Recommend:  -Schedule colonoscopy with Dr. Pinon  Diagnosis: CRC screen  Sedation: MAC  Prep: split dose miralax / gatorade    -Buy over the counter dulcolax laxative, and take one tablet daily for 3 days prior to drinking the bowel prep.   OR  -Buy over the counter miralax, mix one capful with water daily for 2 weeks prior to drinking the bowel prep.    *Do NOT to consume seeds, nuts, corn, popcorn or raw fruits/vegetables for 5 days prior to next colonoscopy. And start clear liquid diet the day prior (no solid food breakfast).    *HOLD farxiga for 4 days prior to procedure  -Anti-platelets and anti-coagulants: N/A     Colonoscopy consent: I have discussed the risks, benefits, and alternatives to colonoscopy with the patient [who demonstrated understanding], including but not limited to the risks of bleeding, infection, pain, as well as the risks of anesthesia and perforation all leading to prolonged hospitalization, surgical intervention. I also specifically mentioned the miss rate of colonoscopy of 5-10% in the best of all circumstances. All questions were answered to the patient’s satisfaction. The patient elected to proceed with colonoscopy with intervention [i.e. polypectomy, etc.] as indicated.    Orders This Visit:  No orders of the defined types were placed in this encounter.      Meds This Visit:  Requested Prescriptions      No prescriptions requested or ordered in this encounter       Imaging & Referrals:  None       ROMAN Allen    [facility]  3/1/2024      The dictation was partially prepared using Dragon Medical voice recognition software. As a result, errors may occur. When identified, these errors have been corrected. While every attempt is  made to correct errors during dictation, discrepancies may still exist.

## 2024-03-01 NOTE — PATIENT INSTRUCTIONS
1. Schedule colonoscopy with Dr. Pinon  Diagnosis: CRC screen  Sedation: MAC  Prep: split dose miralax / gatorade    -Buy over the counter dulcolax laxative, and take one tablet daily for 3 days prior to drinking the bowel prep.   OR  -Buy over the counter miralax, mix one capful with water daily for 2 weeks prior to drinking the bowel prep.    *Do NOT to consume seeds, nuts, corn, popcorn or raw fruits/vegetables for 5 days prior to next colonoscopy. And start clear liquid diet the day prior (no solid food breakfast).    *HOLD farxiga for 4 days prior to procedure      2.  bowel prep from pharmacy   You can pick the bowel prep up now and store in a cool, dry place in your home until your scheduled bowel prep start date.    3. MEDICATION CHANGES PRIOR TO PROCEDURE       A. 7 days BEFORE colonoscopy: HOLD Iron (ferrous sulfate/ ferrous gluconate) pills, herbal supplements, multivitamins, or weight loss/diet medications (i.e.Phentermine/Vyvanse)     B. 5 days BEFORE colonoscopy HOLD coumadin or plavix     C. 48 hours BEFORE colonoscopy HOLD xarelto or eliquis (with PCP approval)     D. 1 day BEFORE and day OF colonoscopy: HOLD diabetic medications  (insulin management per endocrine or PCP)     E. Continue ALL other medications as usual      F. DO NOT TAKE: Any form of alcohol, recreational drugs and any forms of erectile dysfunction medications 24 hours prior to procedure.    4. Read all bowel prep instructions carefully. Bowel prep instructions can also be found online at:  www.[facility].org/giprep     5. AVOID seeds, nuts, popcorn, raw fruits and vegetables for 5 days before procedure    6. If you start any NEW medication after your visit today, please notify us. Certain medications (like iron or weight loss medications) will need to be held before the procedure, or the procedure cannot be performed safely.

## 2024-03-27 DIAGNOSIS — E11.65 TYPE 2 DIABETES MELLITUS WITH HYPERGLYCEMIA, WITHOUT LONG-TERM CURRENT USE OF INSULIN (HCC): ICD-10-CM

## 2024-03-28 RX ORDER — DAPAGLIFLOZIN 10 MG/1
10 TABLET, FILM COATED ORAL DAILY
Qty: 90 TABLET | Refills: 0 | Status: SHIPPED | OUTPATIENT
Start: 2024-03-28

## 2024-04-03 ENCOUNTER — TELEPHONE (OUTPATIENT)
Dept: FAMILY MEDICINE CLINIC | Facility: CLINIC | Age: 54
End: 2024-04-03

## 2024-04-03 ENCOUNTER — OFFICE VISIT (OUTPATIENT)
Dept: FAMILY MEDICINE CLINIC | Facility: CLINIC | Age: 54
End: 2024-04-03
Payer: COMMERCIAL

## 2024-04-03 VITALS
HEIGHT: 63 IN | BODY MASS INDEX: 30.01 KG/M2 | WEIGHT: 169.38 LBS | DIASTOLIC BLOOD PRESSURE: 72 MMHG | RESPIRATION RATE: 20 BRPM | HEART RATE: 100 BPM | TEMPERATURE: 97 F | SYSTOLIC BLOOD PRESSURE: 118 MMHG | OXYGEN SATURATION: 97 %

## 2024-04-03 DIAGNOSIS — J01.90 ACUTE NON-RECURRENT SINUSITIS, UNSPECIFIED LOCATION: Primary | ICD-10-CM

## 2024-04-03 DIAGNOSIS — J45.21 MILD INTERMITTENT ASTHMA WITH (ACUTE) EXACERBATION (HCC): ICD-10-CM

## 2024-04-03 PROCEDURE — 99214 OFFICE O/P EST MOD 30 MIN: CPT

## 2024-04-03 RX ORDER — AMOXICILLIN AND CLAVULANATE POTASSIUM 875; 125 MG/1; MG/1
1 TABLET, FILM COATED ORAL 2 TIMES DAILY
Qty: 20 TABLET | Refills: 0 | Status: SHIPPED | OUTPATIENT
Start: 2024-04-03 | End: 2024-04-13

## 2024-04-03 NOTE — TELEPHONE ENCOUNTER
Called patient and she c/o following symptoms:  - head congestion and sinus pressure radiating to cheekbones and teeth  - burning in the nose  - intermittent wheezing (Pt has hx of asthma, uses inhaler PRN with relief)  - mild, intermittent cough  - low grade fever, sweating, chills yesterday (resolved)    Symptoms onset: 3 weeks ago.    Pt reports she was exposed to grandson that was tested positive for RSV and had Pneumonia.   Pt voiced concern she needs antibiotics for possible sinusitis.    Pt denies SOB, Chest pain, Nausea, vomiting, diarrhea, fever at this time.    Pt reports she tested negative for Covid 3x. Last test was yesterday. Advised to complete test again today and call us back with the result.   If Covid negative, Okay to schedule with Fanw OWENS? Please advise. Thanks.

## 2024-04-03 NOTE — TELEPHONE ENCOUNTER
Patient has had a headache for the past three weeks. Patient would like to be seen this week. If possible to schedule with [facility]? Please advise.

## 2024-04-03 NOTE — PATIENT INSTRUCTIONS
Take the antibiotic with food.  Take a probiotic while you are on the antibiotic, such as Align (this is a capsule that is available over-the-counter), or organic yogurt  Drink plenty of fluids and electrolytes.    Update me with any side effects with Augmentin. With any swelling, sensation of throat closure, difficulty breathing, shortness of breath, go to the nearest ER or call 911 immediately.

## 2024-04-03 NOTE — PROGRESS NOTES
[facility] Family Medicine Office Note  Chief Complaint:   Chief Complaint   Patient presents with    Sinus Problem       HPI:   This is a 53 year old female coming in for symptoms of nasal congestion, sinus pressure which radiates to teeth, headaches, intermittent wheezing, and slight cough over the last 3 weeks. Denies fevers, chills, or shortness of breath.     Patient has a history of asthma. Uses albuterol inhaler PRN which relieves wheezing. She has not been using Symbicort as prescribed, as she is concerned this will increase her blood sugars.     Patient has multiple drug allergies. States she is not able to tolerate the alcohol component that is an additive in many medications. Reports she had allergy testing completed which showed she does not have an allergy to penicillin derivatives. Had a dental procedure in the past and believes she took Amoxicillin afterwards.     Past Medical History:   Diagnosis Date    Asthma (HCC)     Diabetes (HCC)     Genital herpes simplex     Hair abnormality hirsuitism    Hx of motion sickness     Pap smear for cervical cancer screening 07/16    Neg HPV Neg    PONV (postoperative nausea and vomiting)     Visual impairment      Past Surgical History:   Procedure Laterality Date    ORAL SURGERY       Social History:  Social History     Socioeconomic History    Marital status:    Tobacco Use    Smoking status: Every Day     Packs/day: .5     Types: Cigarettes    Smokeless tobacco: Never    Tobacco comments:     one pack a day   Vaping Use    Vaping Use: Never used   Substance and Sexual Activity    Alcohol use: No    Drug use: No    Sexual activity: Yes   Other Topics Concern    Caffeine Concern Yes    Exercise Yes     Comment: active     Family History:  Family History   Problem Relation Age of Onset    Hypertension Mother     Other (Other) Mother         colitis    Diabetes Brother     Colon Cancer Maternal Uncle      Allergies:  Allergies   Allergen Reactions     Alcohol SWELLING     Itching inner ears, mouth, throat    Codeine SWELLING    Invokana [Canagliflozin] HIVES and ITCHING     Redness      Metformin SWELLING     All over swelling, no resp distress    Bactrim [Sulfamethoxazole W/Trimethoprim] HIVES    Ceclor [Cefaclor] ITCHING    Levaquin [Levofloxacin] FACE FLUSHING     Eyelid redness    Januvia [Sitagliptin] RASH    Jardiance [Empagliflozin] RASH     Current Meds:  Current Outpatient Medications   Medication Sig Dispense Refill    amoxicillin clavulanate 875-125 MG Oral Tab Take 1 tablet by mouth 2 (two) times daily for 10 days. 20 tablet 0    dapagliflozin (FARXIGA) 10 MG Oral Tab Take 1 tablet (10 mg total) by mouth daily. 90 tablet 0    albuterol (VENTOLIN HFA) 108 (90 Base) MCG/ACT Inhalation Aero Soln Inhale 2 puffs into the lungs every 4 (four) hours as needed for Wheezing. 3 each 1    Halobetasol Propionate 0.05 % External Ointment Apply a thin layer to affected area twice daily PRN 50 g 2    valACYclovir 1 G Oral Tab Take 1 mg by mouth every 12 (twelve) hours. (Patient not taking: Reported on 4/3/2024)      hydrOXYzine 10 MG Oral Tab Take 1 tablet (10 mg total) by mouth 2 (two) times daily as needed for Itching. (Patient not taking: Reported on 4/3/2024) 30 tablet 2    Budesonide-Formoterol Fumarate (SYMBICORT) 160-4.5 MCG/ACT Inhalation Aerosol Inhale 2 puffs into the lungs 2 (two) times daily. (Patient not taking: Reported on 4/3/2024) 3 each 0      Ready to quit: Not Answered  Counseling given: Not Answered  Tobacco comments: one pack a day       REVIEW OF SYSTEMS:   Review of Systems   Constitutional: Negative.    HENT:  Positive for congestion, dental problem (tooth pain), postnasal drip, rhinorrhea and sinus pressure. Negative for sore throat.    Eyes: Negative.    Respiratory:  Positive for cough and wheezing. Negative for chest tightness and shortness of breath.    Cardiovascular: Negative.    Gastrointestinal: Negative.    Endocrine: Negative.     Genitourinary: Negative.    Musculoskeletal: Negative.    Skin: Negative.    Neurological:  Positive for headaches.   Hematological: Negative.    Psychiatric/Behavioral: Negative.           EXAM:   /72   Pulse 100   Temp 97 °F (36.1 °C) (Temporal)   Resp 20   Ht 5' 3\" (1.6 m)   Wt 169 lb 6.4 oz (76.8 kg)   LMP  (LMP Unknown)   SpO2 97%   BMI 30.01 kg/m²  Estimated body mass index is 30.01 kg/m² as calculated from the following:    Height as of this encounter: 5' 3\" (1.6 m).    Weight as of this encounter: 169 lb 6.4 oz (76.8 kg).   Vital signs reviewed.Appears stated age, well groomed.  Physical Exam  Constitutional:       Appearance: Normal appearance.   HENT:      Head: Normocephalic and atraumatic.      Right Ear: Tympanic membrane normal.      Left Ear: Tympanic membrane normal.      Nose: Congestion and rhinorrhea present.      Right Sinus: Maxillary sinus tenderness and frontal sinus tenderness present.      Left Sinus: Maxillary sinus tenderness and frontal sinus tenderness present.      Mouth/Throat:      Mouth: Mucous membranes are moist.      Pharynx: Oropharynx is clear. No oropharyngeal exudate or posterior oropharyngeal erythema.   Eyes:      Extraocular Movements: Extraocular movements intact.      Conjunctiva/sclera: Conjunctivae normal.      Pupils: Pupils are equal, round, and reactive to light.   Cardiovascular:      Rate and Rhythm: Normal rate and regular rhythm.      Pulses: Normal pulses.      Heart sounds: Normal heart sounds.   Pulmonary:      Effort: Pulmonary effort is normal.      Breath sounds: Decreased air movement present. Examination of the right-upper field reveals wheezing. Examination of the left-upper field reveals wheezing. Wheezing present.   Musculoskeletal:      Cervical back: Normal range of motion.   Lymphadenopathy:      Cervical: No cervical adenopathy.   Skin:     General: Skin is warm and dry.      Capillary Refill: Capillary refill takes less than 2  seconds.   Neurological:      General: No focal deficit present.      Mental Status: She is alert and oriented to person, place, and time.      Cranial Nerves: Cranial nerves 2-12 are intact.      Sensory: Sensation is intact.      Motor: Motor function is intact.   Psychiatric:         Mood and Affect: Mood normal.         Behavior: Behavior normal.           ASSESSMENT AND PLAN:     1. Acute non-recurrent sinusitis, unspecified location    2. Mild intermittent asthma with (acute) exacerbation (HCC)      Due to duration of symptoms, will start on Augmentin BID x 10 days for sinusitis. Allergy on file to cephalosporins (itching). Patient states she had allergy testing completed which did not show allergy to penicillin derivatives, and believes she has been on Amoxicillin following a dental procedure. Advised to update me with any side effects with Augmentin. With any swelling, sensation of throat closure, difficulty breathing, shortness of breath, go to the nearest ER or call 911 immediately.     Slight wheezing noted on examination over bilateral upper lobes. Patient denies SOB, O2 97% in office.   Advised to resume Symbicort BID as prescribed. Continue to monitor symptoms. Notify me with any new/worsening symptoms.     Return if symptoms worsen or fail to improve.    Meds & Refills for this Visit:  Requested Prescriptions     Signed Prescriptions Disp Refills    amoxicillin clavulanate 875-125 MG Oral Tab 20 tablet 0     Sig: Take 1 tablet by mouth 2 (two) times daily for 10 days.       Health Maintenance:  Health Maintenance Due   Topic Date Due    Colorectal Cancer Screening  Never done    Zoster Vaccines (1 of 2) Never done    Diabetes Care Dilated Eye Exam  06/15/2019    Pneumococcal Vaccine: Birth to 64yrs (2 of 2 - PCV) 07/30/2020    COVID-19 Vaccine (4 - 2023-24 season) 09/01/2023    Annual Depression Screening  01/01/2024    HTN: BP Follow-Up  04/01/2024    Diabetes Care A1C  05/03/2024        Patient/Caregiver Education: Patient/Caregiver Education: There are no barriers to learning. Medical education done.   Outcome: Patient verbalizes understanding. Patient is notified to call with any questions, complications, allergies, or worsening or changing symptoms.  Patient is to call with any side effects or complications from the treatments as a result of today.     Problem List:  Patient Active Problem List   Diagnosis    Mild persistent asthma without complication (HCC)    Uncontrolled type 2 diabetes mellitus with hyperglycemia (HCC)       Fawn Perkins, APRN    Please note that portions of this note may have been completed with a voice recognition program. Efforts were made to edit the dictations but occasionally words are mis-transcribed.    The 21st Century Cures Act makes medical notes like these available to patients in the interest of transparency. Please be advised this is a medical document. Medical documents are intended to carry relevant information, facts as evident, and the clinical opinion of the practitioner. The medical note is intended as peer to peer communication and may appear blunt or direct. It is written in medical language and may contain abbreviations or verbiage that are unfamiliar.

## 2024-04-19 ENCOUNTER — TELEPHONE (OUTPATIENT)
Dept: FAMILY MEDICINE CLINIC | Facility: CLINIC | Age: 54
End: 2024-04-19

## 2024-04-19 DIAGNOSIS — E11.65 TYPE 2 DIABETES MELLITUS WITH HYPERGLYCEMIA, WITHOUT LONG-TERM CURRENT USE OF INSULIN (HCC): Primary | ICD-10-CM

## 2024-04-19 NOTE — TELEPHONE ENCOUNTER
See below.    I pended along with cmp/ microalb urine as she is due.    Please approve if appropriate. Thanks.

## 2024-04-19 NOTE — TELEPHONE ENCOUNTER
Pt asking if A1C order can be added to lab orders, she will complete in May prior to her apt 5/15.    Please notify pt if/when orders are placed, thank you.

## 2024-05-10 ENCOUNTER — LAB ENCOUNTER (OUTPATIENT)
Dept: LAB | Age: 54
End: 2024-05-10
Attending: FAMILY MEDICINE
Payer: COMMERCIAL

## 2024-05-10 DIAGNOSIS — Z00.00 ROUTINE GENERAL MEDICAL EXAMINATION AT A HEALTH CARE FACILITY: ICD-10-CM

## 2024-05-10 LAB
ALBUMIN SERPL-MCNC: 4.6 G/DL (ref 3.2–4.8)
ALBUMIN/GLOB SERPL: 1.6 {RATIO} (ref 1–2)
ALP LIVER SERPL-CCNC: 76 U/L
ALT SERPL-CCNC: 22 U/L
ANION GAP SERPL CALC-SCNC: 6 MMOL/L (ref 0–18)
AST SERPL-CCNC: 18 U/L (ref ?–34)
BASOPHILS # BLD AUTO: 0.08 X10(3) UL (ref 0–0.2)
BASOPHILS NFR BLD AUTO: 0.9 %
BILIRUB SERPL-MCNC: 0.8 MG/DL (ref 0.3–1.2)
BUN BLD-MCNC: 16 MG/DL (ref 9–23)
BUN/CREAT SERPL: 19 (ref 10–20)
CALCIUM BLD-MCNC: 10 MG/DL (ref 8.7–10.4)
CHLORIDE SERPL-SCNC: 108 MMOL/L (ref 98–112)
CHOLEST SERPL-MCNC: 165 MG/DL (ref ?–200)
CO2 SERPL-SCNC: 28 MMOL/L (ref 21–32)
CREAT BLD-MCNC: 0.84 MG/DL
CREAT UR-SCNC: 71.5 MG/DL
DEPRECATED RDW RBC AUTO: 44.1 FL (ref 35.1–46.3)
EGFRCR SERPLBLD CKD-EPI 2021: 82 ML/MIN/1.73M2 (ref 60–?)
EOSINOPHIL # BLD AUTO: 0.4 X10(3) UL (ref 0–0.7)
EOSINOPHIL NFR BLD AUTO: 4.6 %
ERYTHROCYTE [DISTWIDTH] IN BLOOD BY AUTOMATED COUNT: 12.8 % (ref 11–15)
EST. AVERAGE GLUCOSE BLD GHB EST-MCNC: 177 MG/DL (ref 68–126)
FASTING PATIENT LIPID ANSWER: YES
FASTING STATUS PATIENT QL REPORTED: YES
GLOBULIN PLAS-MCNC: 2.8 G/DL (ref 2–3.5)
GLUCOSE BLD-MCNC: 129 MG/DL (ref 70–99)
HBA1C MFR BLD: 7.8 % (ref ?–5.7)
HCT VFR BLD AUTO: 49.7 %
HDLC SERPL-MCNC: 50 MG/DL (ref 40–59)
HGB BLD-MCNC: 16.9 G/DL
IMM GRANULOCYTES # BLD AUTO: 0.01 X10(3) UL (ref 0–1)
IMM GRANULOCYTES NFR BLD: 0.1 %
LDLC SERPL CALC-MCNC: 96 MG/DL (ref ?–100)
LYMPHOCYTES # BLD AUTO: 2.15 X10(3) UL (ref 1–4)
LYMPHOCYTES NFR BLD AUTO: 24.9 %
MCH RBC QN AUTO: 31.8 PG (ref 26–34)
MCHC RBC AUTO-ENTMCNC: 34 G/DL (ref 31–37)
MCV RBC AUTO: 93.6 FL
MICROALBUMIN UR-MCNC: <0.3 MG/DL
MONOCYTES # BLD AUTO: 0.57 X10(3) UL (ref 0.1–1)
MONOCYTES NFR BLD AUTO: 6.6 %
NEUTROPHILS # BLD AUTO: 5.43 X10 (3) UL (ref 1.5–7.7)
NEUTROPHILS # BLD AUTO: 5.43 X10(3) UL (ref 1.5–7.7)
NEUTROPHILS NFR BLD AUTO: 62.9 %
NONHDLC SERPL-MCNC: 115 MG/DL (ref ?–130)
OSMOLALITY SERPL CALC.SUM OF ELEC: 297 MOSM/KG (ref 275–295)
PLATELET # BLD AUTO: 246 10(3)UL (ref 150–450)
POTASSIUM SERPL-SCNC: 4 MMOL/L (ref 3.5–5.1)
PROT SERPL-MCNC: 7.4 G/DL (ref 5.7–8.2)
RBC # BLD AUTO: 5.31 X10(6)UL
SODIUM SERPL-SCNC: 142 MMOL/L (ref 136–145)
TRIGL SERPL-MCNC: 102 MG/DL (ref 30–149)
TSI SER-ACNC: 0.85 MIU/ML (ref 0.55–4.78)
VLDLC SERPL CALC-MCNC: 17 MG/DL (ref 0–30)
WBC # BLD AUTO: 8.6 X10(3) UL (ref 4–11)

## 2024-05-10 PROCEDURE — 85025 COMPLETE CBC W/AUTO DIFF WBC: CPT

## 2024-05-10 PROCEDURE — 83036 HEMOGLOBIN GLYCOSYLATED A1C: CPT | Performed by: FAMILY MEDICINE

## 2024-05-10 PROCEDURE — 82570 ASSAY OF URINE CREATININE: CPT | Performed by: FAMILY MEDICINE

## 2024-05-10 PROCEDURE — 80061 LIPID PANEL: CPT

## 2024-05-10 PROCEDURE — 82043 UR ALBUMIN QUANTITATIVE: CPT | Performed by: FAMILY MEDICINE

## 2024-05-10 PROCEDURE — 80053 COMPREHEN METABOLIC PANEL: CPT | Performed by: FAMILY MEDICINE

## 2024-05-10 PROCEDURE — 84443 ASSAY THYROID STIM HORMONE: CPT

## 2024-05-10 PROCEDURE — 36415 COLL VENOUS BLD VENIPUNCTURE: CPT | Performed by: FAMILY MEDICINE

## 2024-05-15 ENCOUNTER — OFFICE VISIT (OUTPATIENT)
Dept: FAMILY MEDICINE CLINIC | Facility: CLINIC | Age: 54
End: 2024-05-15

## 2024-05-15 VITALS
OXYGEN SATURATION: 100 % | HEART RATE: 80 BPM | HEIGHT: 62.99 IN | WEIGHT: 164 LBS | RESPIRATION RATE: 14 BRPM | DIASTOLIC BLOOD PRESSURE: 68 MMHG | TEMPERATURE: 98 F | BODY MASS INDEX: 29.06 KG/M2 | SYSTOLIC BLOOD PRESSURE: 108 MMHG

## 2024-05-15 DIAGNOSIS — J45.31 MILD PERSISTENT ASTHMA WITH EXACERBATION (HCC): ICD-10-CM

## 2024-05-15 DIAGNOSIS — E11.65 UNCONTROLLED TYPE 2 DIABETES MELLITUS WITH HYPERGLYCEMIA (HCC): Primary | ICD-10-CM

## 2024-05-15 PROCEDURE — 99214 OFFICE O/P EST MOD 30 MIN: CPT | Performed by: FAMILY MEDICINE

## 2024-05-15 PROCEDURE — G2211 COMPLEX E/M VISIT ADD ON: HCPCS | Performed by: FAMILY MEDICINE

## 2024-05-15 RX ORDER — ALBUTEROL SULFATE 90 UG/1
2 AEROSOL, METERED RESPIRATORY (INHALATION) EVERY 4 HOURS PRN
Qty: 3 EACH | Refills: 1 | Status: SHIPPED | OUTPATIENT
Start: 2024-05-15

## 2024-05-15 RX ORDER — PREDNISONE 20 MG/1
60 TABLET ORAL DAILY
Qty: 15 TABLET | Refills: 0 | Status: SHIPPED | OUTPATIENT
Start: 2024-05-15 | End: 2024-05-20

## 2024-05-15 NOTE — ADDENDUM NOTE
Addended by: ZENAIDA DE LOS SANTOS on: 5/15/2024 05:08 PM     Modules accepted: Level of Service

## 2024-05-15 NOTE — PROGRESS NOTES
[facility] Family Medicine Office Note  Chief Complaint:   Chief Complaint   Patient presents with    Follow - Up     Diabetes       HPI:   This is a 53 year old female coming in for follow up of diabetes and asthma.    1.  Diabetes - The patient presents for recheck of her diabetes. Last HgbA1c was 7.8%, done recently. Last visit with ophthalmologist was over a year ago. Last microalbumin recent. Pt has been checking her feet on a regular basis. Pt denies any tingling of the feet. Patient is aware that she needs to see ophthalmologist for diabetic eye exam. Pt denies any sx's of depression. Pt complains of nothing today.  Patient hesitant to start any GLP-1 agonist or statin therapy despite the known risks for heart disease from DM2 and elevated coronary calcium score of 84.    2.  Asthma - The patient presents for recheck of asthma sx's. Patient reports her asthma has been under fair control. When symptoms occur they are described as wheezing and non-productive cough. Patient has been using her inhaler symbicort. Having to use albuterol once every few weeks to months. Pt has been admitted to the ER or hospital for asthma in the past. Pt has been on steroids for asthma attacks in the past. There is a family hx of asthma. When exercising there is not wheezing.   Admits to using albuterol more often recently due to not taking symbicort.        Past Medical History:    Asthma (HCC)    Diabetes (HCC)    Genital herpes simplex    Hair abnormality    Hx of motion sickness    Pap smear for cervical cancer screening    Neg HPV Neg    PONV (postoperative nausea and vomiting)    Visual impairment     Past Surgical History:   Procedure Laterality Date    Oral surgery       Social History:  Social History     Socioeconomic History    Marital status:    Tobacco Use    Smoking status: Every Day     Current packs/day: 0.50     Types: Cigarettes    Smokeless tobacco: Never    Tobacco comments:     one pack a day    Vaping Use    Vaping status: Never Used   Substance and Sexual Activity    Alcohol use: No    Drug use: No    Sexual activity: Yes   Other Topics Concern    Caffeine Concern Yes    Exercise Yes     Comment: active     Family History:  Family History   Problem Relation Age of Onset    Hypertension Mother     Other (Other) Mother         colitis    Diabetes Brother     Colon Cancer Maternal Uncle      Allergies:  Allergies   Allergen Reactions    Alcohol SWELLING     Itching inner ears, mouth, throat    Codeine SWELLING    Invokana [Canagliflozin] HIVES and ITCHING     Redness      Metformin SWELLING     All over swelling, no resp distress    Bactrim [Sulfamethoxazole W/Trimethoprim] HIVES    Ceclor [Cefaclor] ITCHING    Levaquin [Levofloxacin] FACE FLUSHING     Eyelid redness    Januvia [Sitagliptin] RASH    Jardiance [Empagliflozin] RASH     Current Meds:  Current Outpatient Medications   Medication Sig Dispense Refill    predniSONE 20 MG Oral Tab Take 3 tablets (60 mg total) by mouth daily for 5 days. 15 tablet 0    albuterol (VENTOLIN HFA) 108 (90 Base) MCG/ACT Inhalation Aero Soln Inhale 2 puffs into the lungs every 4 (four) hours as needed for Wheezing. 3 each 1    dapagliflozin (FARXIGA) 10 MG Oral Tab Take 1 tablet (10 mg total) by mouth daily. 90 tablet 0    valACYclovir 1 G Oral Tab Take 1 mg by mouth every 12 (twelve) hours. (Patient not taking: Reported on 4/3/2024)      hydrOXYzine 10 MG Oral Tab Take 1 tablet (10 mg total) by mouth 2 (two) times daily as needed for Itching. (Patient not taking: Reported on 4/3/2024) 30 tablet 2    Budesonide-Formoterol Fumarate (SYMBICORT) 160-4.5 MCG/ACT Inhalation Aerosol Inhale 2 puffs into the lungs 2 (two) times daily. (Patient not taking: Reported on 4/3/2024) 3 each 0    Halobetasol Propionate 0.05 % External Ointment Apply a thin layer to affected area twice daily PRN (Patient not taking: Reported on 5/15/2024) 50 g 2      Ready to quit: Not  Answered  Counseling given: Not Answered  Tobacco comments: one pack a day       REVIEW OF SYSTEMS:   ROS:  CONSTITUTIONAL:  Denies any unusual weight gain/loss, fever, chills, weakness or fatigue.  CARDIOVASCULAR:  Denies chest pain, chest pressure or chest discomfort. No palpitations or edema.  Denies any dyspnea on exertion or at rest  RESPIRATORY:  Denies shortness of breath, cough  GASTROINTESTINAL:  Denies any abdominal pain, nausea, vomiting  NEUROLOGICAL:  Denies headache, dizziness, syncope, numbness or tingling in the extremities.  MUSCULOSKELETAL:  Denies muscle, back pain, joint pain or stiffness.  ALLERGIES:  + asthma    EXAM:   /68   Pulse 80   Temp 98.1 °F (36.7 °C)   Resp 14   Ht 5' 2.99\" (1.6 m)   Wt 164 lb (74.4 kg)   LMP  (LMP Unknown)   SpO2 100%   BMI 29.06 kg/m²  Estimated body mass index is 29.06 kg/m² as calculated from the following:    Height as of this encounter: 5' 2.99\" (1.6 m).    Weight as of this encounter: 164 lb (74.4 kg).   Vital signs reviewed.Appears stated age, well groomed.  Physical Exam:  GEN:  Patient is alert and oriented x3, no apparent distress  HEAD:  Normocephalic, atraumatic  NECK:  Supple, no LAD  LUNGS: clear to auscultation bilaterally, no rales/rhonchi, + expiratory wheezing  HEART:  Regular rate and rhythm, no murmurs, rubs or gallops  ABDOMEN:  Soft, nondistended, nontender, bowel sounds normal in all 4 quadrants, no hepatosplenomegaly  EXTREMITIES:  Strength intact with 5/5 bilaterally upper and lower extremities, no edema noted  NEURO:  CN 2 - 12 grossly intact     ASSESSMENT AND PLAN:   1. Controlled type 2 diabetes mellitus without complication, without long-term current use of insulin (HCC)  -  Last A1c 7.8  -  patient hesitant to start GLP-1 agonist after long discussion  -  recommended statin therapy based on elevated coronary calcium score and DM2 but again patient hesitant and refuses  -  diabetic retinopathy appt ordered  -  Continue  farxiga 10mg daily  -  Continue low carb diet and exercise  -  F/u in 3 months    2. Mild persistent asthma with exacerbation  -  start prednisone 60mg daily x 5 days  -  continue symbicort  -  continue albuterol PRN  -  encourage smoking cessation          Meds & Refills for this Visit:  Requested Prescriptions     Signed Prescriptions Disp Refills    predniSONE 20 MG Oral Tab 15 tablet 0     Sig: Take 3 tablets (60 mg total) by mouth daily for 5 days.    albuterol (VENTOLIN HFA) 108 (90 Base) MCG/ACT Inhalation Aero Soln 3 each 1     Sig: Inhale 2 puffs into the lungs every 4 (four) hours as needed for Wheezing.       Health Maintenance:  Asthma Action Plan due on 08/01/1968  Asthma Control Test due on 08/01/1968  Tobacco Cessation Counseling 2 Years due on 08/01/1980  Pneumococcal PPSV23 Medium Risk Adult(1 of 1 - PPSV23) due on 08/01/1987  Pap Smear,3 Years due on 08/01/1999  Mammogram due on 09/26/2017  FIT Colorectal Screening due on 08/01/2018  Colonoscopy due on 08/01/2018  Diabetes Care Dilated Eye Exam due on 06/15/2019  Annual Depression Screen due on 06/25/2019    Patient/Caregiver Education: Patient/Caregiver Education: There are no barriers to learning. Medical education done.   Outcome: Patient verbalizes understanding. Patient is notified to call with any questions, complications, allergies, or worsening or changing symptoms.  Patient is to call with any side effects or complications from the treatments as a result of today.     Problem List:  Patient Active Problem List   Diagnosis    Mild persistent asthma without complication (HCC)    Uncontrolled type 2 diabetes mellitus with hyperglycemia (HCC)       ZNEAIDA DE LOS SANTOS, DO

## 2024-05-23 ENCOUNTER — NURSE ONLY (OUTPATIENT)
Dept: INTERNAL MEDICINE CLINIC | Facility: CLINIC | Age: 54
End: 2024-05-23

## 2024-05-23 DIAGNOSIS — E11.65 UNCONTROLLED TYPE 2 DIABETES MELLITUS WITH HYPERGLYCEMIA (HCC): ICD-10-CM

## 2024-05-23 PROCEDURE — 92229 IMG RTA DETC/MNTR DS POC ALY: CPT

## 2024-06-21 DIAGNOSIS — E11.65 TYPE 2 DIABETES MELLITUS WITH HYPERGLYCEMIA, WITHOUT LONG-TERM CURRENT USE OF INSULIN (HCC): ICD-10-CM

## 2024-06-21 RX ORDER — DAPAGLIFLOZIN 10 MG/1
10 TABLET, FILM COATED ORAL DAILY
Qty: 90 TABLET | Refills: 0 | Status: SHIPPED | OUTPATIENT
Start: 2024-06-21

## 2024-06-21 NOTE — TELEPHONE ENCOUNTER
Last office visit: 5/15/2024  Last Refill:  3/28/2024  Return To Clinic: none stated per last office visit  Protocol:  Please refill if appropriate            Medication Quantity Refills Start End   dapagliflozin (FARXIGA) 10 MG Oral Tab 90 tablet 0 3/28/2024 --   Sig:   Take 1 tablet (10 mg total) by mouth daily.     Route:   Oral

## 2024-07-08 NOTE — TELEPHONE ENCOUNTER
Patient would like to take Jardiance  instead of farxiga   Insurance will cover Jardiance , medication pended

## 2024-07-10 ENCOUNTER — TELEPHONE (OUTPATIENT)
Dept: FAMILY MEDICINE CLINIC | Facility: CLINIC | Age: 54
End: 2024-07-10

## 2024-07-10 RX ORDER — DAPAGLIFLOZIN 10 MG/1
10 TABLET, FILM COATED ORAL DAILY
Qty: 90 TABLET | Refills: 0 | Status: CANCELLED | OUTPATIENT
Start: 2024-07-10 | End: 2024-10-08

## 2024-07-10 NOTE — TELEPHONE ENCOUNTER
Patient states insurance needs prior authorization for FARXIGA 10 MG Oral Tab for [facility]. Patient would like update on approval. Patient did hand forms to Dr. Blanco on July 5 during husbands office visit.

## 2024-07-11 ENCOUNTER — TELEPHONE (OUTPATIENT)
Dept: FAMILY MEDICINE CLINIC | Facility: CLINIC | Age: 54
End: 2024-07-11

## 2024-07-11 DIAGNOSIS — E11.65 TYPE 2 DIABETES MELLITUS WITH HYPERGLYCEMIA, WITHOUT LONG-TERM CURRENT USE OF INSULIN (HCC): ICD-10-CM

## 2024-07-11 RX ORDER — DAPAGLIFLOZIN 10 MG/1
10 TABLET, FILM COATED ORAL DAILY
Qty: 90 TABLET | Refills: 0 | Status: SHIPPED | OUTPATIENT
Start: 2024-07-11 | End: 2024-10-09

## 2024-07-15 ENCOUNTER — TELEPHONE (OUTPATIENT)
Dept: FAMILY MEDICINE CLINIC | Facility: CLINIC | Age: 54
End: 2024-07-15

## 2024-07-15 NOTE — TELEPHONE ENCOUNTER
Called and spoke with candy received approval number [id number]  Good through 7-11-24/7-11-25   Patient is aware

## 2024-08-20 ENCOUNTER — TELEPHONE (OUTPATIENT)
Facility: CLINIC | Age: 54
End: 2024-08-20

## 2024-08-20 NOTE — TELEPHONE ENCOUNTER
----- Message from Angel Redclaumiguel sent at 8/20/2024  6:01 PM CDT -----  I spoke to the patient.  She is feeling well.  She had #12 polyps removed.  #8 were tubular or serrated adenomas.  The remainder were hyperplastic.  I discussed the significance.  Uncomplicated diverticulosis was present as well.  I recommended a high-fiber diet for diverticulosis and a surveillance colonoscopy in 3 years.    GI RNs: Please enter colonoscopy recall for 3 years.

## 2024-08-20 NOTE — TELEPHONE ENCOUNTER
Recall colonoscopy in 3 years per Dr Pinon.    Colon done 8/9/2024    Health maintenance updated and message sent to patient outreach to repeat colonoscopy in 3 years

## 2024-09-10 ENCOUNTER — OFFICE VISIT (OUTPATIENT)
Facility: CLINIC | Age: 54
End: 2024-09-10
Payer: COMMERCIAL

## 2024-09-10 VITALS
SYSTOLIC BLOOD PRESSURE: 112 MMHG | DIASTOLIC BLOOD PRESSURE: 56 MMHG | WEIGHT: 166 LBS | HEIGHT: 62.99 IN | BODY MASS INDEX: 29.41 KG/M2 | HEART RATE: 90 BPM

## 2024-09-10 DIAGNOSIS — N95.8 GENITOURINARY SYNDROME OF MENOPAUSE: ICD-10-CM

## 2024-09-10 DIAGNOSIS — N76.0 VAGINITIS AND VULVOVAGINITIS: Primary | ICD-10-CM

## 2024-09-10 PROCEDURE — 81514 NFCT DS BV&VAGINITIS DNA ALG: CPT

## 2024-09-10 PROCEDURE — 99213 OFFICE O/P EST LOW 20 MIN: CPT

## 2024-09-10 RX ORDER — ESTRADIOL 0.1 MG/G
CREAM VAGINAL
Qty: 42.5 G | Refills: 3 | Status: SHIPPED | OUTPATIENT
Start: 2024-09-10

## 2024-09-10 NOTE — PROGRESS NOTES
Cristy Taylor is a 54 year old female  No LMP recorded (lmp unknown). Patient is postmenopausal.   Chief Complaint   Patient presents with    Gyn Problem     C/o vaginal itching, chalky chunky yellow discharge   - tried otc 7day monistat, made itching worse    .    OBSTETRICS HISTORY:  OB History    Para Term  AB Living   2 2 2     2   SAB IAB Ectopic Multiple Live Births           2      # Outcome Date GA Lbr Oj/2nd Weight Sex Type Anes PTL Lv   2 Term  40w0d   M NORMAL SPONT   VAISHNAVI   1 Term  40w0d   F NORMAL SPONT   VAISHNAVI       GYNE HISTORY:  Periods none due to menopause    History   Sexual Activity    Sexual activity: Yes        Pap Date: 23  Pap Result Notes: negative        MEDICAL HISTORY:  Past Medical History:    Amenorrhea    Menopause    Asthma (HCC)    Diabetes (HCC)    Diabetes mellitus (HCC)    Type 2    Genital herpes simplex    Hair abnormality    Hx of motion sickness    Pap smear for cervical cancer screening    Neg HPV Neg    PONV (postoperative nausea and vomiting)    Visual impairment       SURGICAL HISTORY:  Past Surgical History:   Procedure Laterality Date    Colonoscopy N/A 2024    Procedure: COLONOSCOPY;  Surgeon: Angel Pinon MD;  Location: [facility] MAIN OR    Oral surgery      Other surgical history      Tonsillectomy    Tonsillectomy         SOCIAL HISTORY:  Social History     Socioeconomic History    Marital status:      Spouse name: Not on file    Number of children: Not on file    Years of education: Not on file    Highest education level: Not on file   Occupational History    Not on file   Tobacco Use    Smoking status: Every Day     Current packs/day: 0.50     Types: Cigarettes    Smokeless tobacco: Never    Tobacco comments:     one pack a day   Vaping Use    Vaping status: Never Used   Substance and Sexual Activity    Alcohol use: No    Drug use: No    Sexual activity: Yes   Other Topics Concern     Service  Not Asked    Blood Transfusions Not Asked    Caffeine Concern No    Occupational Exposure Not Asked    Hobby Hazards Not Asked    Sleep Concern Not Asked    Stress Concern No    Weight Concern No    Special Diet No    Back Care Not Asked    Exercise No    Bike Helmet Not Asked    Seat Belt No    Self-Exams Not Asked   Social History Narrative    Not on file     Social Determinants of Health     Financial Resource Strain: Not on file   Food Insecurity: Not on file   Transportation Needs: Not on file   Physical Activity: Not on file   Stress: Not on file   Social Connections: Not on file   Housing Stability: Not on file       FAMILY HISTORY:  Family History   Problem Relation Age of Onset    Hypertension Mother     Other (Other) Mother         colitis    Cancer Father             Diabetes Brother     Colon Cancer Maternal Uncle     Diabetes Maternal Grandfather                MEDICATIONS:    Current Outpatient Medications:     estradiol (ESTRACE) 0.1 MG/GM Vaginal Cream, Place 1 g vaginally every evening for 14 days, THEN 1 g twice a week., Disp: 42.5 g, Rfl: 3    dapagliflozin (FARXIGA) 10 MG Oral Tab, Take 1 tablet (10 mg total) by mouth daily., Disp: 90 tablet, Rfl: 0    albuterol (VENTOLIN HFA) 108 (90 Base) MCG/ACT Inhalation Aero Soln, Inhale 2 puffs into the lungs every 4 (four) hours as needed for Wheezing., Disp: 3 each, Rfl: 1    valACYclovir 1 G Oral Tab, Take 1 mg by mouth every 12 (twelve) hours., Disp: , Rfl:     hydrOXYzine 10 MG Oral Tab, Take 1 tablet (10 mg total) by mouth 2 (two) times daily as needed for Itching. (Patient not taking: Reported on 4/3/2024), Disp: 30 tablet, Rfl: 2    Budesonide-Formoterol Fumarate (SYMBICORT) 160-4.5 MCG/ACT Inhalation Aerosol, Inhale 2 puffs into the lungs 2 (two) times daily. (Patient not taking: Reported on 4/3/2024), Disp: 3 each, Rfl: 0    Halobetasol Propionate 0.05 % External Ointment, Apply a thin layer to affected area twice daily PRN  (Patient not taking: Reported on 5/15/2024), Disp: 50 g, Rfl: 2    ALLERGIES:    Allergies   Allergen Reactions    Alcohol SWELLING     Itching inner ears, mouth, throat    Codeine SWELLING    Invokana [Canagliflozin] HIVES and ITCHING     Redness      Metformin SWELLING     All over swelling, no resp distress    Bactrim [Sulfamethoxazole W/Trimethoprim] HIVES    Ceclor [Cefaclor] ITCHING    Levaquin [Levofloxacin] FACE FLUSHING     Eyelid redness    Januvia [Sitagliptin] RASH    Jardiance [Empagliflozin] RASH         PHYSICAL EXAM:   Pelvic Exam:  External Genitalia: normal appearance, hair distribution, and no lesions  Urethral Meatus:  normal in size, location, without lesions and prolapse  Bladder:  No fullness, masses or tenderness  Vagina:  Normal appearance without lesions, no abnormal discharge  Cervix:  Normal without tenderness on motion  Uterus: normal in size, contour, position, mobility, without tenderness  Adnexa: normal without masses or tenderness  Perineum: normal  Anus: no hemorroids     Assessment & Plan:  1. Vaginitis and vulvovaginitis    - Vaginitis Vaginosis PCR Panel; Future  - estradiol (ESTRACE) 0.1 MG/GM Vaginal Cream; Place 1 g vaginally every evening for 14 days, THEN 1 g twice a week.  Dispense: 42.5 g; Refill: 3      2. GSM   -estrace cream

## 2024-09-11 LAB
BV BACTERIA DNA VAG QL NAA+PROBE: NEGATIVE
C GLABRATA DNA VAG QL NAA+PROBE: POSITIVE
C KRUSEI DNA VAG QL NAA+PROBE: NEGATIVE
CANDIDA DNA VAG QL NAA+PROBE: POSITIVE
T VAGINALIS DNA VAG QL NAA+PROBE: NEGATIVE

## 2024-09-12 NOTE — PROGRESS NOTES
Pt aware of results & recommendations for boric acid suppositories. She is concerned about all of her medication allergies. I let her know the active ingredient is boric acid. Contains no fillers or artificial ingredients. She is still a little apprehensive to try them. I let her know I would get a message to Christiane and call her back. She is also not able to get to the store to pick them up. 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 I let her know she can order them through [employer]. She is not a member. Asked if a relative could pick them up. She said no. Kids are in school and there is no time. She will look into the boric acid but requesting Diflucan to take care of the candida group. I told her it would not help the candida glabrata. She would like to take care of one while looking into boric acid. Will route for recommendations. Verbalized understanding.

## 2024-09-13 RX ORDER — FLUCONAZOLE 150 MG/1
TABLET ORAL
Qty: 2 TABLET | Refills: 0 | Status: SHIPPED | OUTPATIENT
Start: 2024-09-13

## 2024-09-13 NOTE — PROGRESS NOTES
Spoke with pt. Aware she can take Diflucan but won't help the glabrata. Pt understands and would like an rx for Diflucan sent. She was able to  boric acid. Order pended. Verbalized understanding.

## 2024-09-23 ENCOUNTER — TELEPHONE (OUTPATIENT)
Dept: FAMILY MEDICINE CLINIC | Facility: CLINIC | Age: 54
End: 2024-09-23

## 2024-09-23 NOTE — TELEPHONE ENCOUNTER
Pt requesting appointment for \"crackling\" in ear and possible sinus infection.    Scheduled for next available date, 9/30/24, pt wondering if possible to be seen on Wednesday 9/25?    Please advise?

## 2024-09-30 ENCOUNTER — OFFICE VISIT (OUTPATIENT)
Dept: FAMILY MEDICINE CLINIC | Facility: CLINIC | Age: 54
End: 2024-09-30
Payer: COMMERCIAL

## 2024-09-30 VITALS
HEART RATE: 104 BPM | RESPIRATION RATE: 16 BRPM | BODY MASS INDEX: 29.27 KG/M2 | DIASTOLIC BLOOD PRESSURE: 70 MMHG | SYSTOLIC BLOOD PRESSURE: 116 MMHG | TEMPERATURE: 97 F | WEIGHT: 165.19 LBS | HEIGHT: 63 IN

## 2024-09-30 DIAGNOSIS — E11.65 TYPE 2 DIABETES MELLITUS WITH HYPERGLYCEMIA, WITHOUT LONG-TERM CURRENT USE OF INSULIN (HCC): ICD-10-CM

## 2024-09-30 DIAGNOSIS — J01.00 ACUTE NON-RECURRENT MAXILLARY SINUSITIS: Primary | ICD-10-CM

## 2024-09-30 PROCEDURE — 99214 OFFICE O/P EST MOD 30 MIN: CPT | Performed by: FAMILY MEDICINE

## 2024-09-30 RX ORDER — DAPAGLIFLOZIN 10 MG/1
10 TABLET, FILM COATED ORAL DAILY
Qty: 90 TABLET | Refills: 0 | Status: SHIPPED | OUTPATIENT
Start: 2024-09-30 | End: 2024-12-29

## 2024-09-30 NOTE — PROGRESS NOTES
[facility] Family Medicine Office Note  Chief Complaint:   No chief complaint on file.      HPI:   This is a 54 year old female coming in for sinus congestion and type 2 diabetes.  Patient states over the last 2 to 3 weeks, she is having worsening sinus congestion particular in the left maxillary region.  She also is having popping sensation in her left ear.  Denies any significant headaches.  Denies any fever.  She has a mild productive cough primarily in the mornings.  States she has tolerated Augmentin in the past for sinusitis.    Patient also has a history of type 2 diabetes.  She is due for repeat labs.  States she also needs a prescription printed for Farxiga for insurance purposes.      Past Medical History:    Amenorrhea    Menopause    Asthma (HCC)    Diabetes (HCC)    Diabetes mellitus (HCC)    Type 2    Genital herpes simplex    Hair abnormality    Hx of motion sickness    Pap smear for cervical cancer screening    Neg HPV Neg    PONV (postoperative nausea and vomiting)    Visual impairment     Past Surgical History:   Procedure Laterality Date    Colonoscopy N/A 08/09/2024    Procedure: COLONOSCOPY;  Surgeon: Angel Pinon MD;  Location: [facility] MAIN OR    Oral surgery      Other surgical history  2022    Tonsillectomy    Tonsillectomy  2022     Social History:  Social History     Socioeconomic History    Marital status:    Tobacco Use    Smoking status: Every Day     Current packs/day: 0.50     Types: Cigarettes    Smokeless tobacco: Never    Tobacco comments:     one pack a day   Vaping Use    Vaping status: Never Used   Substance and Sexual Activity    Alcohol use: No    Drug use: No    Sexual activity: Yes   Other Topics Concern    Caffeine Concern No    Stress Concern No    Weight Concern No    Special Diet No    Exercise No    Seat Belt No     Family History:  Family History   Problem Relation Age of Onset    Hypertension Mother     Other (Other) Mother         colitis     Cancer Father             Diabetes Brother     Colon Cancer Maternal Uncle     Diabetes Maternal Grandfather              Allergies:  Allergies   Allergen Reactions    Alcohol SWELLING     Itching inner ears, mouth, throat    Codeine SWELLING    Invokana [Canagliflozin] HIVES and ITCHING     Redness      Metformin SWELLING     All over swelling, no resp distress    Bactrim [Sulfamethoxazole W/Trimethoprim] HIVES    Ceclor [Cefaclor] ITCHING    Levaquin [Levofloxacin] FACE FLUSHING     Eyelid redness    Januvia [Sitagliptin] RASH    Jardiance [Empagliflozin] RASH     Current Meds:  Current Outpatient Medications   Medication Sig Dispense Refill    amoxicillin clavulanate 875-125 MG Oral Tab Take 1 tablet by mouth 2 (two) times daily for 10 days. 20 tablet 0    dapagliflozin (FARXIGA) 10 MG Oral Tab Take 1 tablet (10 mg total) by mouth daily. 90 tablet 0    fluconazole (DIFLUCAN) 150 MG Oral Tab Take 1 tablet by mouth now. May repeat in 72 hrs if symptoms persist. Please call office if not resolved after 2 doses. 2 tablet 0    estradiol (ESTRACE) 0.1 MG/GM Vaginal Cream Place 1 g vaginally every evening for 14 days, THEN 1 g twice a week. 42.5 g 3    albuterol (VENTOLIN HFA) 108 (90 Base) MCG/ACT Inhalation Aero Soln Inhale 2 puffs into the lungs every 4 (four) hours as needed for Wheezing. 3 each 1    valACYclovir 1 G Oral Tab Take 1 mg by mouth every 12 (twelve) hours.      hydrOXYzine 10 MG Oral Tab Take 1 tablet (10 mg total) by mouth 2 (two) times daily as needed for Itching. (Patient not taking: Reported on 4/3/2024) 30 tablet 2    Budesonide-Formoterol Fumarate (SYMBICORT) 160-4.5 MCG/ACT Inhalation Aerosol Inhale 2 puffs into the lungs 2 (two) times daily. (Patient not taking: Reported on 4/3/2024) 3 each 0    Halobetasol Propionate 0.05 % External Ointment Apply a thin layer to affected area twice daily PRN (Patient not taking: Reported on 5/15/2024) 50 g 2      Ready to quit: Not  Answered  Counseling given: Not Answered  Tobacco comments: one pack a day       REVIEW OF SYSTEMS:   ROS:  CONSTITUTIONAL:  Denies any unusual weight gain/loss, fever, chills, weakness or fatigue.  HEENT:  Eyes:  Denies visual loss, blurred vision, double vision or yellow sclerae. Ears, Nose, Throat:  Denies hearing loss, sneezing or sore throat. + sinus congestion and runny nose  SKIN:  No rash or itching.  CARDIOVASCULAR:  Denies chest pain, chest pressure or chest discomfort. No palpitations or edema.  Denies any dyspnea on exertion or at rest  RESPIRATORY:  Denies shortness of breath, cough  GASTROINTESTINAL:  Denies any abdominal pain, nausea, vomiting  NEUROLOGICAL:  Denies headache, dizziness, syncope, numbness or tingling in the extremities.  MUSCULOSKELETAL:  Denies muscle, back pain, joint pain or stiffness.    EXAM:   /70   Pulse 104   Temp 97.2 °F (36.2 °C) (Temporal)   Resp 16   Ht 5' 3\" (1.6 m)   Wt 165 lb 3.2 oz (74.9 kg)   LMP  (LMP Unknown)   BMI 29.26 kg/m²  Estimated body mass index is 29.26 kg/m² as calculated from the following:    Height as of this encounter: 5' 3\" (1.6 m).    Weight as of this encounter: 165 lb 3.2 oz (74.9 kg).   Vital signs reviewed.Appears stated age, well groomed.  Physical Exam:  GEN:  Patient is alert and oriented x3, no apparent distress  HEAD:  Normocephalic, atraumatic  NECK:  Supple, no LAD  HEENT: TM's clear and visible bilaterally, no frontal or sinus tendereness  LUNGS: clear to auscultation bilaterally, no rales/rhonchi/wheezing  HEART:  Regular rate and rhythm, no murmurs, rubs or gallops  ABDOMEN:  Soft, nondistended, nontender, bowel sounds normal in all 4 quadrants, no hepatosplenomegaly  EXTREMITIES:  Strength intact with 5/5 bilaterally upper and lower extremities, no edema noted  NEURO:  CN 2 - 12 grossly intact     ASSESSMENT AND PLAN:   1. Acute non-recurrent maxillary sinusitis  -  persistent  -  start augmentin  -  f/u if no  improvement  - amoxicillin clavulanate 875-125 MG Oral Tab; Take 1 tablet by mouth 2 (two) times daily for 10 days.  Dispense: 20 tablet; Refill: 0    2. Type 2 diabetes mellitus with hyperglycemia, without long-term current use of insulin (HCC)  -  recheck labs  -  cpm  -  eye exam up to date  -  further recs once labs are finalized  - dapagliflozin (FARXIGA) 10 MG Oral Tab; Take 1 tablet (10 mg total) by mouth daily.  Dispense: 90 tablet; Refill: 0  - CMP Now; Future  - Lipids Now; Future  - Hemoglobin A1C Now; Future  - Microalb/Creat Ratio, Random Urine Now; Future      Meds & Refills for this Visit:  Requested Prescriptions     Signed Prescriptions Disp Refills    amoxicillin clavulanate 875-125 MG Oral Tab 20 tablet 0     Sig: Take 1 tablet by mouth 2 (two) times daily for 10 days.    dapagliflozin (FARXIGA) 10 MG Oral Tab 90 tablet 0     Sig: Take 1 tablet (10 mg total) by mouth daily.       Health Maintenance:  Health Maintenance Due   Topic Date Due    Zoster Vaccines (1 of 2) Never done    Pneumococcal Vaccine: Birth to 64yrs (2 of 2 - PCV) 07/30/2020    Annual Depression Screening  01/01/2024    Tobacco Cessation Counseling  Never done    COVID-19 Vaccine (4 - 2023-24 season) 09/01/2024    Mammogram  10/24/2024    Annual Physical  11/06/2024       Patient/Caregiver Education: Patient/Caregiver Education: There are no barriers to learning. Medical education done.   Outcome: Patient verbalizes understanding. Patient is notified to call with any questions, complications, allergies, or worsening or changing symptoms.  Patient is to call with any side effects or complications from the treatments as a result of today.     Problem List:  Patient Active Problem List   Diagnosis    Mild persistent asthma without complication (HCC)    Uncontrolled type 2 diabetes mellitus with hyperglycemia (HCC)       ZENAIDA DE LOS SANTOS, DO    Please note that portions of this note may have been completed with a voice recognition  program. Efforts were made to edit the dictations but occasionally words are mis-transcribed.

## 2024-10-03 ENCOUNTER — TELEPHONE (OUTPATIENT)
Dept: FAMILY MEDICINE CLINIC | Facility: CLINIC | Age: 54
End: 2024-10-03

## 2024-10-03 NOTE — TELEPHONE ENCOUNTER
Please notify patient.  I believe she had a reaction to Jardiance but please verify with her before sending it.

## 2024-10-03 NOTE — TELEPHONE ENCOUNTER
Called back patient, states that she changed insurance from [employer] to [employer] and there was an error regarding farxiga not getting approved. Patient confirmed her allergy to jardiance.    Patient wants to update the office that she was able to get approved with Farxiga for a 3-month supply. No other concerns at this time.

## 2024-10-03 NOTE — TELEPHONE ENCOUNTER
Received a fax from [employer] saying that dapagliflozin 10 mg tabs are not covered by patients insurance. Preferred alternative is jardiance tabs.     Please prescribe jardiance if appropriate.

## 2024-12-19 ENCOUNTER — LAB ENCOUNTER (OUTPATIENT)
Dept: LAB | Age: 54
End: 2024-12-19
Attending: FAMILY MEDICINE
Payer: COMMERCIAL

## 2024-12-19 DIAGNOSIS — E11.65 TYPE 2 DIABETES MELLITUS WITH HYPERGLYCEMIA, WITHOUT LONG-TERM CURRENT USE OF INSULIN (HCC): ICD-10-CM

## 2024-12-19 LAB
ALBUMIN SERPL-MCNC: 4.6 G/DL (ref 3.2–4.8)
ALBUMIN/GLOB SERPL: 1.6 {RATIO} (ref 1–2)
ALP LIVER SERPL-CCNC: 70 U/L
ALT SERPL-CCNC: 22 U/L
ANION GAP SERPL CALC-SCNC: 7 MMOL/L (ref 0–18)
AST SERPL-CCNC: 22 U/L (ref ?–34)
BILIRUB SERPL-MCNC: 0.7 MG/DL (ref 0.3–1.2)
BUN BLD-MCNC: 13 MG/DL (ref 9–23)
CALCIUM BLD-MCNC: 9.6 MG/DL (ref 8.7–10.4)
CHLORIDE SERPL-SCNC: 104 MMOL/L (ref 98–112)
CHOLEST SERPL-MCNC: 164 MG/DL (ref ?–200)
CO2 SERPL-SCNC: 28 MMOL/L (ref 21–32)
CREAT BLD-MCNC: 0.72 MG/DL
CREAT UR-SCNC: 54.5 MG/DL
EGFRCR SERPLBLD CKD-EPI 2021: 98 ML/MIN/1.73M2 (ref 60–?)
EST. AVERAGE GLUCOSE BLD GHB EST-MCNC: 189 MG/DL (ref 68–126)
FASTING PATIENT LIPID ANSWER: YES
FASTING STATUS PATIENT QL REPORTED: YES
GLOBULIN PLAS-MCNC: 2.9 G/DL (ref 2–3.5)
GLUCOSE BLD-MCNC: 127 MG/DL (ref 70–99)
HBA1C MFR BLD: 8.2 % (ref ?–5.7)
HDLC SERPL-MCNC: 51 MG/DL (ref 40–59)
LDLC SERPL CALC-MCNC: 99 MG/DL (ref ?–100)
MICROALBUMIN UR-MCNC: <0.3 MG/DL
NONHDLC SERPL-MCNC: 113 MG/DL (ref ?–130)
OSMOLALITY SERPL CALC.SUM OF ELEC: 290 MOSM/KG (ref 275–295)
POTASSIUM SERPL-SCNC: 4 MMOL/L (ref 3.5–5.1)
PROT SERPL-MCNC: 7.5 G/DL (ref 5.7–8.2)
SODIUM SERPL-SCNC: 139 MMOL/L (ref 136–145)
TRIGL SERPL-MCNC: 75 MG/DL (ref 30–149)
VLDLC SERPL CALC-MCNC: 12 MG/DL (ref 0–30)

## 2024-12-19 PROCEDURE — 80053 COMPREHEN METABOLIC PANEL: CPT

## 2024-12-19 PROCEDURE — 82570 ASSAY OF URINE CREATININE: CPT

## 2024-12-19 PROCEDURE — 36415 COLL VENOUS BLD VENIPUNCTURE: CPT

## 2024-12-19 PROCEDURE — 80061 LIPID PANEL: CPT

## 2024-12-19 PROCEDURE — 82043 UR ALBUMIN QUANTITATIVE: CPT

## 2024-12-19 PROCEDURE — 83036 HEMOGLOBIN GLYCOSYLATED A1C: CPT

## 2025-01-09 ENCOUNTER — OFFICE VISIT (OUTPATIENT)
Dept: FAMILY MEDICINE CLINIC | Facility: CLINIC | Age: 55
End: 2025-01-09
Payer: COMMERCIAL

## 2025-01-09 VITALS
SYSTOLIC BLOOD PRESSURE: 130 MMHG | WEIGHT: 166.81 LBS | TEMPERATURE: 98 F | RESPIRATION RATE: 16 BRPM | HEIGHT: 63 IN | HEART RATE: 120 BPM | BODY MASS INDEX: 29.55 KG/M2 | DIASTOLIC BLOOD PRESSURE: 76 MMHG

## 2025-01-09 DIAGNOSIS — Z12.31 ENCOUNTER FOR SCREENING MAMMOGRAM FOR MALIGNANT NEOPLASM OF BREAST: ICD-10-CM

## 2025-01-09 DIAGNOSIS — R20.0 NUMBNESS AND TINGLING OF BOTH UPPER EXTREMITIES: ICD-10-CM

## 2025-01-09 DIAGNOSIS — E11.65 UNCONTROLLED TYPE 2 DIABETES MELLITUS WITH HYPERGLYCEMIA (HCC): Primary | ICD-10-CM

## 2025-01-09 DIAGNOSIS — R20.2 NUMBNESS AND TINGLING OF BOTH UPPER EXTREMITIES: ICD-10-CM

## 2025-01-09 DIAGNOSIS — J45.30 MILD PERSISTENT ASTHMA WITHOUT COMPLICATION (HCC): ICD-10-CM

## 2025-01-09 PROCEDURE — 3078F DIAST BP <80 MM HG: CPT | Performed by: FAMILY MEDICINE

## 2025-01-09 PROCEDURE — 99214 OFFICE O/P EST MOD 30 MIN: CPT | Performed by: FAMILY MEDICINE

## 2025-01-09 PROCEDURE — 3008F BODY MASS INDEX DOCD: CPT | Performed by: FAMILY MEDICINE

## 2025-01-09 PROCEDURE — 3075F SYST BP GE 130 - 139MM HG: CPT | Performed by: FAMILY MEDICINE

## 2025-01-09 PROCEDURE — G2211 COMPLEX E/M VISIT ADD ON: HCPCS | Performed by: FAMILY MEDICINE

## 2025-01-09 RX ORDER — DAPAGLIFLOZIN 10 MG/1
10 TABLET, FILM COATED ORAL DAILY
COMMUNITY
Start: 2024-12-26 | End: 2025-01-09

## 2025-01-09 RX ORDER — DAPAGLIFLOZIN 10 MG/1
10 TABLET, FILM COATED ORAL DAILY
Qty: 90 TABLET | Refills: 0 | Status: SHIPPED | OUTPATIENT
Start: 2025-01-09

## 2025-01-09 RX ORDER — VALACYCLOVIR HYDROCHLORIDE 1 G/1
1000 TABLET, FILM COATED ORAL EVERY 12 HOURS SCHEDULED
Qty: 21 TABLET | Refills: 0 | Status: SHIPPED | OUTPATIENT
Start: 2025-01-09

## 2025-01-09 RX ORDER — ALBUTEROL SULFATE 90 UG/1
2 INHALANT RESPIRATORY (INHALATION) EVERY 4 HOURS PRN
Qty: 3 EACH | Refills: 0 | Status: SHIPPED | OUTPATIENT
Start: 2025-01-09

## 2025-01-09 RX ORDER — HALOBETASOL PROPIONATE 0.05 %
OINTMENT (GRAM) TOPICAL
Qty: 50 G | Refills: 2 | Status: SHIPPED | OUTPATIENT
Start: 2025-01-09

## 2025-01-09 RX ORDER — BUDESONIDE AND FORMOTEROL FUMARATE DIHYDRATE 160; 4.5 UG/1; UG/1
2 AEROSOL RESPIRATORY (INHALATION) 2 TIMES DAILY
Qty: 3 EACH | Refills: 0 | Status: SHIPPED | OUTPATIENT
Start: 2025-01-09

## 2025-01-09 RX ORDER — HYDROXYZINE HYDROCHLORIDE 10 MG/1
10 TABLET, FILM COATED ORAL 2 TIMES DAILY PRN
Qty: 30 TABLET | Refills: 2 | Status: SHIPPED | OUTPATIENT
Start: 2025-01-09

## 2025-01-10 NOTE — PROGRESS NOTES
[facility] Family Medicine Office Note  Chief Complaint:   Chief Complaint   Patient presents with    Medication Follow-Up       HPI:   This is a 54 year old female coming in for:    DM2 - The patient presents for recheck of her diabetes. Last HgbA1c was 8.2%, done recently. Last visit with ophthalmologist was within last 12 months. Last microalbumin was recent. Pt has been checking her feet on a regular basis. Pt denies any tingling of the feet but is having in both arms/hands. Pt denies any sx's of depression. Pt complains of nothing today.  Numbness/tingling UE -patient states she has been having numbness and tingling in both her arms and hands.  It is particularly worse when she wakes up in the morning.  She is able to shake her arms and resolve the symptoms.  She does have some mild neck pain as well.  Denies any injuries.  Denies any weakness in the upper extremities.  Asthma - The patient presents for recheck of asthma sx's. Lately the patient's asthma has been  under good control. When symptoms occur they are described as wheezing and non-productive cough. Patient has been using her inhaler symbicort and albuterol PRN. Pt has been admitted to the ER or hospital for asthma in the past. Pt has been on steroids for asthma attacks in the past. There is not a family hx of asthma. When exercising there is not wheezing.    Past Medical History:    Amenorrhea    Menopause    Asthma (HCC)    Diabetes (HCC)    Diabetes mellitus (HCC)    Type 2    Genital herpes simplex    Hair abnormality    Hx of motion sickness    Pap smear for cervical cancer screening    Neg HPV Neg    PONV (postoperative nausea and vomiting)    Visual impairment     Past Surgical History:   Procedure Laterality Date    Colonoscopy N/A 08/09/2024    Procedure: COLONOSCOPY;  Surgeon: Angel Pinon MD;  Location: [facility] MAIN OR    Oral surgery      Other surgical history  2022    Tonsillectomy    Tonsillectomy  2022     Social  History:  Social History     Socioeconomic History    Marital status:    Tobacco Use    Smoking status: Every Day     Current packs/day: 0.50     Types: Cigarettes    Smokeless tobacco: Never    Tobacco comments:     one pack a day   Vaping Use    Vaping status: Never Used   Substance and Sexual Activity    Alcohol use: No    Drug use: No    Sexual activity: Yes   Other Topics Concern    Caffeine Concern No    Stress Concern No    Weight Concern No    Special Diet No    Exercise No    Seat Belt No     Family History:  Family History   Problem Relation Age of Onset    Hypertension Mother     Other (Other) Mother         colitis    Cancer Father             Diabetes Brother     Colon Cancer Maternal Uncle     Diabetes Maternal Grandfather              Allergies:  Allergies[1]  Current Meds:  Current Outpatient Medications   Medication Sig Dispense Refill    albuterol (VENTOLIN HFA) 108 (90 Base) MCG/ACT Inhalation Aero Soln Inhale 2 puffs into the lungs every 4 (four) hours as needed for Wheezing. 3 each 0    Budesonide-Formoterol Fumarate (SYMBICORT) 160-4.5 MCG/ACT Inhalation Aerosol Inhale 2 puffs into the lungs 2 (two) times daily. 3 each 0    FARXIGA 10 MG Oral Tab Take 1 tablet (10 mg total) by mouth daily. 90 tablet 0    hydrOXYzine 10 MG Oral Tab Take 1 tablet (10 mg total) by mouth 2 (two) times daily as needed for Itching. 30 tablet 2    valACYclovir 1 G Oral Tab Take 1 tablet (1,000 mg total) by mouth every 12 (twelve) hours. 21 tablet 0    Halobetasol Propionate 0.05 % External Ointment Apply a thin layer to affected area twice daily PRN for no more than 2 weeks per application 50 g 2    estradiol (ESTRACE) 0.1 MG/GM Vaginal Cream Place 1 g vaginally every evening for 14 days, THEN 1 g twice a week. 42.5 g 3    fluconazole (DIFLUCAN) 150 MG Oral Tab Take 1 tablet by mouth now. May repeat in 72 hrs if symptoms persist. Please call office if not resolved after 2 doses. (Patient not  taking: Reported on 1/9/2025) 2 tablet 0      Ready to quit: Not Answered  Counseling given: Not Answered  Tobacco comments: one pack a day       REVIEW OF SYSTEMS:   ROS:  CONSTITUTIONAL:  Denies any unusual weight gain/loss, fever, chills, weakness or fatigue.  CARDIOVASCULAR:  Denies chest pain, chest pressure or chest discomfort. No palpitations or edema.  Denies any dyspnea on exertion or at rest  RESPIRATORY:  Denies shortness of breath, cough  GASTROINTESTINAL:  Denies any abdominal pain, nausea, vomiting  NEUROLOGICAL:  Denies headache, dizziness, syncope, + numbness/tingling in the upper extremities.  MUSCULOSKELETAL:  + neck pain  ALLERGIES:  + asthma    EXAM:   /76   Pulse 120   Temp 98 °F (36.7 °C) (Temporal)   Resp 16   Ht 5' 3\" (1.6 m)   Wt 166 lb 12.8 oz (75.7 kg)   LMP  (LMP Unknown)   BMI 29.55 kg/m²  Estimated body mass index is 29.55 kg/m² as calculated from the following:    Height as of this encounter: 5' 3\" (1.6 m).    Weight as of this encounter: 166 lb 12.8 oz (75.7 kg).   Vital signs reviewed.Appears stated age, well groomed.  Physical Exam:  GEN:  Patient is alert and oriented x3, no apparent distress  HEAD:  Normocephalic, atraumatic  NECK:  Supple, no LAD  LUNGS: clear to auscultation bilaterally, no rales/rhonchi/wheezing  HEART:  Regular rate and rhythm, no murmurs, rubs or gallops  ABDOMEN:  Soft, nondistended, nontender, bowel sounds normal in all 4 quadrants, no hepatosplenomegaly  EXTREMITIES:  Strength intact with 5/5 bilaterally upper and lower extremities, no edema noted  NEURO:  CN 2 - 12 grossly intact, Bilateral barefoot skin diabetic exam is normal, visualized feet and the appearance is normal.  Bilateral monofilament/sensation of both feet is normal.  Pulsation pedal pulse exam of both lower legs/feet is normal as well.    ASSESSMENT AND PLAN:   1. Uncontrolled type 2 diabetes mellitus with hyperglycemia (HCC)  -  A1c is still too high at 8.2  -  recommend GLP-1  agonist - patient cannot do needles so recommended rybelsus and patient to check with insurance for coverage  -  continue farxiga  -  unable to tolerate metformin in the past  -  recommend strict low carb intake and if A1c not improved in 3 months, will refer to diabetes services  -  cholesterol well controlled but will have discussion regarding statin use at next appt  -  foot exam done  -  f/u in 3 months for annual physical and med check  - FARXIGA 10 MG Oral Tab; Take 1 tablet (10 mg total) by mouth daily.  Dispense: 90 tablet; Refill: 0    2. Mild persistent asthma without complication (HCC)  -  stable  -  cpm  - albuterol (VENTOLIN HFA) 108 (90 Base) MCG/ACT Inhalation Aero Soln; Inhale 2 puffs into the lungs every 4 (four) hours as needed for Wheezing.  Dispense: 3 each; Refill: 0    3. Numbness and tingling of both upper extremities  -  r/o cervical canal stenosis  -  check C-spine xray  -  consider EMG if xray is noncontributory  - XR CERVICAL SPINE (4VIEWS) (CPT=72050); Future    4. Encounter for screening mammogram for malignant neoplasm of breast  -  mammogram ordered  - [facility] MARSHA 2D+3D SCREENING BILAT (CPT=77067/29159); Future      Meds & Refills for this Visit:  Requested Prescriptions     Signed Prescriptions Disp Refills    albuterol (VENTOLIN HFA) 108 (90 Base) MCG/ACT Inhalation Aero Soln 3 each 0     Sig: Inhale 2 puffs into the lungs every 4 (four) hours as needed for Wheezing.    Budesonide-Formoterol Fumarate (SYMBICORT) 160-4.5 MCG/ACT Inhalation Aerosol 3 each 0     Sig: Inhale 2 puffs into the lungs 2 (two) times daily.    FARXIGA 10 MG Oral Tab 90 tablet 0     Sig: Take 1 tablet (10 mg total) by mouth daily.    hydrOXYzine 10 MG Oral Tab 30 tablet 2     Sig: Take 1 tablet (10 mg total) by mouth 2 (two) times daily as needed for Itching.    valACYclovir 1 G Oral Tab 21 tablet 0     Sig: Take 1 tablet (1,000 mg total) by mouth every 12 (twelve) hours.    Halobetasol Propionate 0.05 % External  Ointment 50 g 2     Sig: Apply a thin layer to affected area twice daily PRN for no more than 2 weeks per application       Health Maintenance:  Health Maintenance Due   Topic Date Due    Zoster Vaccines (1 of 2) Never done    Asthma Control Test  09/24/2019    Pneumococcal Vaccine: 50+ Years (2 of 2 - PCV) 07/30/2020    COVID-19 Vaccine (4 - 2024-25 season) 09/01/2024    Influenza Vaccine (1) 10/01/2024    Mammogram  10/24/2024    Annual Physical  11/06/2024    Annual Depression Screening  01/01/2025    Diabetes Care: Foot Exam (Annual)  01/01/2025    Tobacco Cessation Counseling  Never done       Patient/Caregiver Education: Patient/Caregiver Education: There are no barriers to learning. Medical education done.   Outcome: Patient verbalizes understanding. Patient is notified to call with any questions, complications, allergies, or worsening or changing symptoms.  Patient is to call with any side effects or complications from the treatments as a result of today.     Problem List:  Patient Active Problem List   Diagnosis    Mild persistent asthma without complication (HCC)    Uncontrolled type 2 diabetes mellitus with hyperglycemia (HCC)       ZENAIDA DE LOS SANTOS, DO    Please note that portions of this note may have been completed with a voice recognition program. Efforts were made to edit the dictations but occasionally words are mis-transcribed.         [1]   Allergies  Allergen Reactions    Alcohol SWELLING     Itching inner ears, mouth, throat    Codeine SWELLING    Invokana [Canagliflozin] HIVES and ITCHING     Redness      Metformin SWELLING     All over swelling, no resp distress    Bactrim [Sulfamethoxazole W/Trimethoprim] HIVES    Ceclor [Cefaclor] ITCHING    Levaquin [Levofloxacin] FACE FLUSHING     Eyelid redness    Januvia [Sitagliptin] RASH    Jardiance [Empagliflozin] RASH

## 2025-02-26 ENCOUNTER — OFFICE VISIT (OUTPATIENT)
Dept: FAMILY MEDICINE CLINIC | Facility: CLINIC | Age: 55
End: 2025-02-26
Payer: COMMERCIAL

## 2025-02-26 VITALS
HEART RATE: 110 BPM | SYSTOLIC BLOOD PRESSURE: 114 MMHG | DIASTOLIC BLOOD PRESSURE: 72 MMHG | TEMPERATURE: 98 F | HEIGHT: 63 IN | RESPIRATION RATE: 20 BRPM | BODY MASS INDEX: 29.98 KG/M2 | WEIGHT: 169.19 LBS

## 2025-02-26 DIAGNOSIS — G89.29 CHRONIC EPIGASTRIC PAIN: ICD-10-CM

## 2025-02-26 DIAGNOSIS — J22 ACUTE LOWER RESPIRATORY INFECTION: Primary | ICD-10-CM

## 2025-02-26 DIAGNOSIS — R10.13 CHRONIC EPIGASTRIC PAIN: ICD-10-CM

## 2025-02-26 PROCEDURE — 3008F BODY MASS INDEX DOCD: CPT | Performed by: FAMILY MEDICINE

## 2025-02-26 PROCEDURE — 3074F SYST BP LT 130 MM HG: CPT | Performed by: FAMILY MEDICINE

## 2025-02-26 PROCEDURE — 99214 OFFICE O/P EST MOD 30 MIN: CPT | Performed by: FAMILY MEDICINE

## 2025-02-26 PROCEDURE — 3078F DIAST BP <80 MM HG: CPT | Performed by: FAMILY MEDICINE

## 2025-02-26 PROCEDURE — G2211 COMPLEX E/M VISIT ADD ON: HCPCS | Performed by: FAMILY MEDICINE

## 2025-02-26 RX ORDER — PREDNISONE 20 MG/1
60 TABLET ORAL DAILY
Qty: 21 TABLET | Refills: 0 | Status: SHIPPED | OUTPATIENT
Start: 2025-02-26 | End: 2025-03-05

## 2025-02-26 RX ORDER — AZITHROMYCIN 250 MG/1
TABLET, FILM COATED ORAL
Qty: 6 TABLET | Refills: 0 | Status: SHIPPED | OUTPATIENT
Start: 2025-02-26 | End: 2025-03-03

## 2025-02-26 NOTE — PROGRESS NOTES
HPI:   Cristy Taylor is a 54 year old female who presents for upper respiratory symptoms for  3  weeks. Patient reports congestion, dry cough, cough is keeping pt up at night, wheezing, OTC cold meds have not been helping, denies fever.    The patient complaints of abdominal pain.  Pain is located at Epigastric. Pain is described as cramping, sharp. Severity is moderate. Associated symptoms: denies n/v/d/c/blood in stool. The pain is non-radiating.  Has had for several months.  Pain is worsened by bending or twisting. Gets relief of pain with nothing. Prior abdominal pain hx: none.      Current Outpatient Medications   Medication Sig Dispense Refill    azithromycin 250 MG Oral Tab Take 2 tablets (500 mg total) by mouth daily for 1 day, THEN 1 tablet (250 mg total) daily for 4 days. 6 tablet 0    predniSONE 20 MG Oral Tab Take 3 tablets (60 mg total) by mouth daily for 7 days. 21 tablet 0    albuterol (VENTOLIN HFA) 108 (90 Base) MCG/ACT Inhalation Aero Soln Inhale 2 puffs into the lungs every 4 (four) hours as needed for Wheezing. 3 each 0    Budesonide-Formoterol Fumarate (SYMBICORT) 160-4.5 MCG/ACT Inhalation Aerosol Inhale 2 puffs into the lungs 2 (two) times daily. 3 each 0    FARXIGA 10 MG Oral Tab Take 1 tablet (10 mg total) by mouth daily. 90 tablet 0    hydrOXYzine 10 MG Oral Tab Take 1 tablet (10 mg total) by mouth 2 (two) times daily as needed for Itching. 30 tablet 2    valACYclovir 1 G Oral Tab Take 1 tablet (1,000 mg total) by mouth every 12 (twelve) hours. 21 tablet 0    Halobetasol Propionate 0.05 % External Ointment Apply a thin layer to affected area twice daily PRN for no more than 2 weeks per application 50 g 2    fluconazole (DIFLUCAN) 150 MG Oral Tab Take 1 tablet by mouth now. May repeat in 72 hrs if symptoms persist. Please call office if not resolved after 2 doses. (Patient not taking: Reported on 2/26/2025) 2 tablet 0      Past Medical History:    Amenorrhea    Menopause    Asthma  (HCC)    Diabetes (HCC)    Diabetes mellitus (HCC)    Type 2    Genital herpes simplex    Hair abnormality    Hx of motion sickness    Pap smear for cervical cancer screening    Neg HPV Neg    PONV (postoperative nausea and vomiting)    Visual impairment      Past Surgical History:   Procedure Laterality Date    Colonoscopy N/A 2024    Procedure: COLONOSCOPY;  Surgeon: Angel Pinon MD;  Location: [facility] MAIN OR    Oral surgery      Other surgical history      Tonsillectomy    Tonsillectomy        Family History   Problem Relation Age of Onset    Hypertension Mother     Other (Other) Mother         colitis    Cancer Father             Diabetes Brother     Colon Cancer Maternal Uncle     Diabetes Maternal Grandfather               Social History     Socioeconomic History    Marital status:    Tobacco Use    Smoking status: Every Day     Current packs/day: 0.50     Types: Cigarettes    Smokeless tobacco: Never    Tobacco comments:     one pack a day   Vaping Use    Vaping status: Never Used   Substance and Sexual Activity    Alcohol use: No    Drug use: No    Sexual activity: Yes   Other Topics Concern    Caffeine Concern No    Stress Concern No    Weight Concern No    Special Diet No    Exercise No    Seat Belt No         REVIEW OF SYSTEMS:   GENERAL: feels well otherwise  SKIN: no rashes  EYES:denies blurred vision or double vision  HEENT: congested; denies sore throat, ear pain, facial pain  LUNGS: denies shortness of breath with exertion; cough  CARDIOVASCULAR: denies chest pain on exertion  GI: no nausea, + chronic abdominal pain  NEURO: denies headaches    EXAM:   /72   Pulse 110   Temp 98 °F (36.7 °C) (Temporal)   Resp 20   Ht 5' 3\" (1.6 m)   Wt 169 lb 3.2 oz (76.7 kg)   LMP  (LMP Unknown)   BMI 29.97 kg/m²   GENERAL: well developed, well nourished,in no apparent distress  SKIN: no rashes,no suspicious lesions  EYES:PERRL, EOMI,conjunctiva are  clear  HEENT: atraumatic, normocephalic,ears and throat are clear; no maxillary and frontal sinus tenderness  NECK: supple,no adenopathy  LUNGS: clear to auscultation, no r/r/w  CARDIO: RRR without murmur  GI: good BS's,no masses, HSM or tenderness    ASSESSMENT AND PLAN:   Cristy Taylor is a 54 year old female who presents with  acute lower respiratory infection . PLAN: Z-rudy, take as directed and prednisone 60mg dailyl x 7 days .  Saline Rinse.  Tylenol or motrin as needed.  Antihistamine prn.  Fluids.  Probiotics advised.  Take abx with food.  Side effects discussed.  The patient indicates understanding of these issues and agrees to the plan.  The patient is asked to return if sx's persist or worsen.    Chronic epigastric pain  R/o hiatal hernia vs. Gastritis vs. Pancreatic disease  Check CT abd w and wo contrast

## 2025-05-06 ENCOUNTER — HOSPITAL ENCOUNTER (OUTPATIENT)
Dept: CT IMAGING | Age: 55
Discharge: HOME | End: 2025-05-06
Attending: FAMILY MEDICINE
Payer: COMMERCIAL

## 2025-05-06 DIAGNOSIS — G89.29 CHRONIC EPIGASTRIC PAIN: ICD-10-CM

## 2025-05-06 DIAGNOSIS — R10.13 CHRONIC EPIGASTRIC PAIN: ICD-10-CM

## 2025-05-06 LAB
CREAT BLD-MCNC: 0.8 MG/DL (ref 0.55–1.02)
EGFRCR SERPLBLD CKD-EPI 2021: 86 ML/MIN/1.73M2 (ref 60–?)

## 2025-05-06 PROCEDURE — 82565 ASSAY OF CREATININE: CPT

## 2025-05-06 PROCEDURE — 74170 CT ABD WO CNTRST FLWD CNTRST: CPT | Performed by: FAMILY MEDICINE

## 2025-06-13 ENCOUNTER — TELEPHONE (OUTPATIENT)
Dept: FAMILY MEDICINE CLINIC | Facility: CLINIC | Age: 55
End: 2025-06-13

## 2025-06-13 DIAGNOSIS — E11.65 TYPE 2 DIABETES MELLITUS WITH HYPERGLYCEMIA, WITHOUT LONG-TERM CURRENT USE OF INSULIN (HCC): Primary | ICD-10-CM

## 2025-06-13 NOTE — TELEPHONE ENCOUNTER
Patient has follow up appointment on 7/11/25 and wants to know if any labs need to ordered specifically A1C

## 2025-06-16 ENCOUNTER — TELEPHONE (OUTPATIENT)
Dept: FAMILY MEDICINE CLINIC | Facility: CLINIC | Age: 55
End: 2025-06-16

## 2025-06-16 DIAGNOSIS — E11.65 UNCONTROLLED TYPE 2 DIABETES MELLITUS WITH HYPERGLYCEMIA (HCC): ICD-10-CM

## 2025-06-16 RX ORDER — DAPAGLIFLOZIN 10 MG/1
10 TABLET, FILM COATED ORAL DAILY
Qty: 90 TABLET | Refills: 0 | Status: SHIPPED | OUTPATIENT
Start: 2025-06-16

## 2025-06-16 NOTE — TELEPHONE ENCOUNTER
Informed patient that fasting labs have been ordered for upcoming appointment, A1c included.     Patient verbalized an understanding and agrees to plan.

## 2025-06-16 NOTE — TELEPHONE ENCOUNTER
Patient called requesting a refill on the FARXIGA 10 MG Oral Tab.    Please send to:   [id number] - [place], [place] - [address] AT [place] [address], [phone], [phone]     Please advise

## 2025-06-16 NOTE — TELEPHONE ENCOUNTER
LOV: 2/26/25 - sick visit           1/9/25 - med check    NOV: 7/11    Requesting refill: farxiga 10mg    Last refill: 1/9/25 #90     Please see pended Rx #30 for your approval. Thank you.

## 2025-08-01 LAB
ABSOLUTE BASOPHILS: 50 CELLS/UL (ref 0–200)
ABSOLUTE EOSINOPHILS: 386 CELLS/UL (ref 15–500)
ABSOLUTE LYMPHOCYTES: 2059 CELLS/UL (ref 850–3900)
ABSOLUTE MONOCYTES: 634 CELLS/UL (ref 200–950)
ABSOLUTE NEUTROPHILS: 6772 CELLS/UL (ref 1500–7800)
ALBUMIN/GLOBULIN RATIO: 1.8 (CALC) (ref 1–2.5)
ALBUMIN: 4.6 G/DL (ref 3.6–5.1)
ALKALINE PHOSPHATASE: 83 U/L (ref 37–153)
ALT: 20 U/L (ref 6–29)
AST: 15 U/L (ref 10–35)
BASOPHILS: 0.5 %
BILIRUBIN, TOTAL: 0.7 MG/DL (ref 0.2–1.2)
BUN: 19 MG/DL (ref 7–25)
CALCIUM: 9.6 MG/DL (ref 8.6–10.4)
CARBON DIOXIDE: 27 MMOL/L (ref 20–32)
CHLORIDE: 101 MMOL/L (ref 98–110)
CHOL/HDLC RATIO: 3 (CALC)
CHOLESTEROL, TOTAL: 172 MG/DL
CREATININE, RANDOM URINE: 8 MG/DL (ref 20–275)
CREATININE: 0.69 MG/DL (ref 0.5–1.03)
EGFR: 102 ML/MIN/1.73M2
EOSINOPHILS: 3.9 %
GLOBULIN: 2.6 G/DL (CALC) (ref 1.9–3.7)
GLUCOSE: 143 MG/DL (ref 65–99)
HDL CHOLESTEROL: 57 MG/DL
HEMATOCRIT: 51.6 % (ref 35–45)
HEMOGLOBIN A1C: 8.8 %
HEMOGLOBIN: 16.9 G/DL (ref 11.7–15.5)
LDL-CHOLESTEROL: 95 MG/DL (CALC)
LYMPHOCYTES: 20.8 %
MCH: 31.5 PG (ref 27–33)
MCHC: 32.8 G/DL (ref 32–36)
MCV: 96.1 FL (ref 80–100)
MICROALBUMIN: <0.2 MG/DL
MONOCYTES: 6.4 %
MPV: 10.8 FL (ref 7.5–12.5)
NEUTROPHILS: 68.4 %
NON-HDL CHOLESTEROL: 115 MG/DL (CALC)
PLATELET COUNT: 247 THOUSAND/UL (ref 140–400)
POTASSIUM: 4.3 MMOL/L (ref 3.5–5.3)
PROTEIN, TOTAL: 7.2 G/DL (ref 6.1–8.1)
RDW: 13 % (ref 11–15)
RED BLOOD CELL COUNT: 5.37 MILLION/UL (ref 3.8–5.1)
SODIUM: 137 MMOL/L (ref 135–146)
TRIGLYCERIDES: 106 MG/DL
WHITE BLOOD CELL COUNT: 9.9 THOUSAND/UL (ref 3.8–10.8)

## 2025-08-08 ENCOUNTER — HOSPITAL ENCOUNTER (OUTPATIENT)
Dept: GENERAL RADIOLOGY | Age: 55
Discharge: HOME | End: 2025-08-08
Attending: FAMILY MEDICINE

## 2025-08-08 ENCOUNTER — OFFICE VISIT (OUTPATIENT)
Dept: FAMILY MEDICINE CLINIC | Facility: CLINIC | Age: 55
End: 2025-08-08

## 2025-08-08 VITALS
WEIGHT: 165 LBS | BODY MASS INDEX: 29.23 KG/M2 | SYSTOLIC BLOOD PRESSURE: 120 MMHG | OXYGEN SATURATION: 94 % | TEMPERATURE: 98 F | HEIGHT: 63 IN | HEART RATE: 115 BPM | DIASTOLIC BLOOD PRESSURE: 72 MMHG

## 2025-08-08 DIAGNOSIS — M25.562 CHRONIC PAIN OF LEFT KNEE: ICD-10-CM

## 2025-08-08 DIAGNOSIS — E11.65 UNCONTROLLED TYPE 2 DIABETES MELLITUS WITH HYPERGLYCEMIA (HCC): Primary | ICD-10-CM

## 2025-08-08 DIAGNOSIS — J45.31 MILD PERSISTENT ASTHMA WITH ACUTE EXACERBATION (HCC): ICD-10-CM

## 2025-08-08 DIAGNOSIS — R20.0 NUMBNESS AND TINGLING OF BOTH UPPER EXTREMITIES: ICD-10-CM

## 2025-08-08 DIAGNOSIS — R20.2 NUMBNESS AND TINGLING OF BOTH UPPER EXTREMITIES: ICD-10-CM

## 2025-08-08 DIAGNOSIS — G89.29 CHRONIC PAIN OF LEFT KNEE: ICD-10-CM

## 2025-08-08 PROCEDURE — 3008F BODY MASS INDEX DOCD: CPT | Performed by: FAMILY MEDICINE

## 2025-08-08 PROCEDURE — 73564 X-RAY EXAM KNEE 4 OR MORE: CPT | Performed by: FAMILY MEDICINE

## 2025-08-08 PROCEDURE — G2211 COMPLEX E/M VISIT ADD ON: HCPCS | Performed by: FAMILY MEDICINE

## 2025-08-08 PROCEDURE — 72050 X-RAY EXAM NECK SPINE 4/5VWS: CPT | Performed by: FAMILY MEDICINE

## 2025-08-08 PROCEDURE — 3061F NEG MICROALBUMINURIA REV: CPT | Performed by: FAMILY MEDICINE

## 2025-08-08 PROCEDURE — 3052F HG A1C>EQUAL 8.0%<EQUAL 9.0%: CPT | Performed by: FAMILY MEDICINE

## 2025-08-08 PROCEDURE — 3074F SYST BP LT 130 MM HG: CPT | Performed by: FAMILY MEDICINE

## 2025-08-08 PROCEDURE — 99214 OFFICE O/P EST MOD 30 MIN: CPT | Performed by: FAMILY MEDICINE

## 2025-08-08 PROCEDURE — 3078F DIAST BP <80 MM HG: CPT | Performed by: FAMILY MEDICINE

## 2025-08-08 RX ORDER — PREDNISONE 20 MG/1
60 TABLET ORAL DAILY
Qty: 15 TABLET | Refills: 0 | Status: SHIPPED | OUTPATIENT
Start: 2025-08-08 | End: 2025-08-13

## 2025-08-08 RX ORDER — AMOXICILLIN 500 MG/1
500 CAPSULE ORAL 3 TIMES DAILY
COMMUNITY
Start: 2025-07-17

## 2025-08-11 RX ORDER — SITAGLIPTIN 100 MG/1
100 TABLET, FILM COATED ORAL DAILY
Qty: 90 TABLET | Refills: 0 | OUTPATIENT
Start: 2025-08-11

## (undated) DIAGNOSIS — E11.65 TYPE 2 DIABETES MELLITUS WITH HYPERGLYCEMIA, WITHOUT LONG-TERM CURRENT USE OF INSULIN (HCC): ICD-10-CM

## (undated) DIAGNOSIS — E11.9 CONTROLLED TYPE 2 DIABETES MELLITUS WITHOUT COMPLICATION, WITHOUT LONG-TERM CURRENT USE OF INSULIN (HCC): Primary | ICD-10-CM

## (undated) DEVICE — STERILE SYNTHETIC POLYISOPRENE POWDER-FREE SURGICAL GLOVES WITH HYDROGEL COATING: Brand: PROTEXIS

## (undated) DEVICE — T & A CDS: Brand: MEDLINE INDUSTRIES, INC.

## (undated) DEVICE — SNARES ARE INTENDED TO BE USED WITH SNARE GUN FOR REMOVAL OF TONSIL AND NASAL POLYPS.: Brand: RICHARD-ALLAN® TONSIL SNARE

## (undated) DEVICE — ENT COBLATOR II, PROCISE XP WAND: Brand: COBLATION

## (undated) DEVICE — SOLUTION  .9 1000ML BTL

## (undated) NOTE — ED AVS SNAPSHOT
Ludmila Benítez   MRN: [id number]    Department:  [facility] Emergency Department   Date of Visit:  4/30/2018           Disclosure     Insurance plans vary and the physician(s) referred by the ER may not be covered by your plan.  Please contac tell this physician (or your personal doctor if your instructions are to return to your personal doctor) about any new or lasting problems. The primary care or specialist physician will see patients referred from the [facility] Emergency Department.  Harsh Maria

## (undated) NOTE — ED AVS SNAPSHOT
Luanne Castro   MRN: [id number]    Department:  [facility] Emergency Department   Date of Visit:  6/23/2018           Disclosure     Insurance plans vary and the physician(s) referred by the ER may not be covered by your plan.  Please contac tell this physician (or your personal doctor if your instructions are to return to your personal doctor) about any new or lasting problems. The primary care or specialist physician will see patients referred from the [facility] Emergency Department.  Lisa Matos

## (undated) NOTE — LETTER
03/02/18        Ashley Franco   [address] [facility] [address]      Dear Mirtha Lambert records indicate that you have outstanding lab work and or testing that was ordered for you and has not yet been completed:          CBC With Differential

## (undated) NOTE — LETTER
Date: 2019    Patient Name: Genoveva Crowell   : 1970    Member ID: [id number]  Reference: [id number]    TO: [facility] Appeals:  Fax: [phone]    To Whom it may concern:     This letter of appeal is written on behalf of the abo

## (undated) NOTE — ED AVS SNAPSHOT
Merlin Beltran   MRN: [id number]    Department:  [facility] Emergency Department   Date of Visit:  11/4/2017           Disclosure     Insurance plans vary and the physician(s) referred by the ER may not be covered by your plan.  Please contact If you have been prescribed any medication(s), please fill your prescription right away and begin taking the medication(s) as directed    If the emergency physician has read X-rays, these will be re-interpreted by a radiologist.  If there is a significant

## (undated) NOTE — LETTER
04/23/21        Edson Franco   [address] [address]      Dear Bharati Anderson records indicate that you have outstanding lab work and or testing that was ordered for you and has not yet been completed:  Orders Placed This Encou

## (undated) NOTE — LETTER
[employer]AN for Cherelle Tam     : 1970     Date: 2018  Provider:  Maximo Murillo DO  Phone for doctor or clinic: [facility] [facility], [address] [place] [address] 36 [phone]

## (undated) NOTE — LETTER
MEDICATION CONTRAINDICATION     Patient Name: Deanna Cho CSN: [id number]  -Age / Sex: 1970-A: 45 y  female Medical Records: [id number]    Surgeon(s):  Horace Redmond MD  Procedure: Tonsillectomy Bilateral, Bilateral    Procedure Comments: Tonsillectomy Bilateral  Anesthesia Type: General    The above patient has a contraindication to the medication ordered from your standing orders/[facility] orders listed below. If you would like the medication given, please fax this form back indicating the override reason with physician signature/date/time.   CONTRAINDICATION WITH ANCEF = ITCHING; CLINDAMYCIN = SWELLING, FACE FLUSHING                ___  Benefit outweighs risk   ___  Insignificant               ___ Low risk                 ___ Not a true allergy              ___ Dose appropriate                ___  Tolerated regimen in the past     Physician Signature: ________________________ Date/Time: ______________  Pradip Knows FORM BACK TO:  [phone]

## (undated) NOTE — LETTER
Date: 2019    Patient Name: Ashley Kaba   : 1970    Member ID : [id number]  Reference: [id number]    To: [facility] Appeals:   Fax: [phone]            To Whom it may concern:     This letter has been written on behalf of t